# Patient Record
Sex: FEMALE | Race: WHITE | NOT HISPANIC OR LATINO | Employment: FULL TIME | URBAN - METROPOLITAN AREA
[De-identification: names, ages, dates, MRNs, and addresses within clinical notes are randomized per-mention and may not be internally consistent; named-entity substitution may affect disease eponyms.]

---

## 2018-09-10 ENCOUNTER — HOSPITAL ENCOUNTER (EMERGENCY)
Facility: HOSPITAL | Age: 43
Discharge: HOME | End: 2018-09-10
Attending: EMERGENCY MEDICINE
Payer: COMMERCIAL

## 2018-09-10 ENCOUNTER — APPOINTMENT (EMERGENCY)
Dept: CT IMAGING | Facility: HOSPITAL | Age: 43
End: 2018-09-10
Payer: COMMERCIAL

## 2018-09-10 VITALS
WEIGHT: 273.15 LBS | BODY MASS INDEX: 46.63 KG/M2 | HEIGHT: 64 IN | HEART RATE: 70 BPM | TEMPERATURE: 97.9 F | SYSTOLIC BLOOD PRESSURE: 126 MMHG | DIASTOLIC BLOOD PRESSURE: 58 MMHG | RESPIRATION RATE: 20 BRPM | OXYGEN SATURATION: 96 %

## 2018-09-10 DIAGNOSIS — N20.1 URETERAL CALCULUS: Primary | ICD-10-CM

## 2018-09-10 LAB
BACTERIA UR QL AUTO: ABNORMAL /HPF
BILIRUB UR QL STRIP: NEGATIVE
CLARITY UR: ABNORMAL
COLOR UR: YELLOW
GLUCOSE UR STRIP-MCNC: NEGATIVE MG/DL
HGB UR QL STRIP.AUTO: ABNORMAL
KETONES UR STRIP-MCNC: NEGATIVE MG/DL
LEUKOCYTE ESTERASE UR QL STRIP: NEGATIVE
NITRITE UR QL STRIP: NEGATIVE
NON-SQ EPI CELLS URNS QL MICRO: ABNORMAL /HPF
PH UR STRIP.AUTO: 7 [PH] (ref 4.5–8)
PROT UR STRIP-MCNC: NEGATIVE MG/DL
RBC #/AREA URNS AUTO: ABNORMAL /HPF
SP GR UR STRIP.AUTO: 1.01 (ref 1–1.03)
UROBILINOGEN UR QL STRIP.AUTO: 0.2 E.U./DL
WBC #/AREA URNS AUTO: ABNORMAL /HPF

## 2018-09-10 PROCEDURE — 81001 URINALYSIS AUTO W/SCOPE: CPT | Performed by: EMERGENCY MEDICINE

## 2018-09-10 PROCEDURE — 99284 EMERGENCY DEPT VISIT MOD MDM: CPT

## 2018-09-10 PROCEDURE — 74176 CT ABD & PELVIS W/O CONTRAST: CPT

## 2018-09-10 RX ORDER — CIPROFLOXACIN 500 MG/1
500 TABLET, FILM COATED ORAL 2 TIMES DAILY
Qty: 14 TABLET | Refills: 0 | Status: SHIPPED | OUTPATIENT
Start: 2018-09-10 | End: 2018-09-17

## 2018-09-10 RX ORDER — PANTOPRAZOLE SODIUM 40 MG/1
40 TABLET, DELAYED RELEASE ORAL DAILY
COMMUNITY
Start: 2018-03-19 | End: 2019-04-24 | Stop reason: SDUPTHER

## 2018-09-10 RX ORDER — ALBUTEROL SULFATE 90 UG/1
2 AEROSOL, METERED RESPIRATORY (INHALATION) EVERY 6 HOURS PRN
COMMUNITY
Start: 2015-12-27

## 2018-09-10 RX ORDER — PHENAZOPYRIDINE HYDROCHLORIDE 200 MG/1
200 TABLET, FILM COATED ORAL 3 TIMES DAILY
Qty: 6 TABLET | Refills: 0 | Status: SHIPPED | OUTPATIENT
Start: 2018-09-10 | End: 2020-10-22

## 2018-09-10 RX ORDER — CETIRIZINE HYDROCHLORIDE 10 MG/1
10 TABLET ORAL DAILY
COMMUNITY

## 2018-09-10 RX ORDER — TORSEMIDE 10 MG/1
20 TABLET ORAL DAILY
COMMUNITY
Start: 2018-05-16 | End: 2021-09-07

## 2018-09-10 RX ORDER — MONTELUKAST SODIUM 10 MG/1
10 TABLET ORAL DAILY
COMMUNITY
Start: 2018-01-31 | End: 2021-09-07

## 2018-09-10 RX ORDER — PHENAZOPYRIDINE HYDROCHLORIDE 100 MG/1
200 TABLET, FILM COATED ORAL ONCE
Status: COMPLETED | OUTPATIENT
Start: 2018-09-10 | End: 2018-09-10

## 2018-09-10 RX ORDER — PRAMIPEXOLE DIHYDROCHLORIDE 0.25 MG/1
0.25 TABLET ORAL
COMMUNITY
Start: 2018-04-16

## 2018-09-10 RX ADMIN — PHENAZOPYRIDINE HYDROCHLORIDE 200 MG: 100 TABLET ORAL at 12:42

## 2018-09-10 NOTE — ED PROVIDER NOTES
History  Chief Complaint   Patient presents with    Back Pain     Left lower  I think I may have a UTI or a kidney infection"     42-year-old female patient presents to the emergency department for evaluation of left flank pain  The patient states taking some sort of a home test for urinary tract infection the came up positive I am not sure what this test is nor what it is testing for  Currently the patient has left flank tenderness, states no nausea, no vomiting, fevers, no chills  Patient states history urinary tract infections remotely, states that this is not like previous ones, states no history of renal calculi  Differential diagnosis for the patient includes but is not limited to renal calculus, ureteral calculus, urinary tract infection  History provided by:  Patient   used: No    Back Pain   Location:  Lumbar spine  Quality:  Aching  Radiates to:  Does not radiate  Pain severity:  Mild  Onset quality:  Gradual  Timing:  Constant  Progression:  Worsening  Chronicity:  New  Relieved by:  Nothing  Worsened by:  Nothing  Ineffective treatments:  None tried  Associated symptoms: no bladder incontinence, no dysuria, no leg pain, no numbness and no tingling        Prior to Admission Medications   Prescriptions Last Dose Informant Patient Reported? Taking?    albuterol (PROVENTIL HFA,VENTOLIN HFA) 90 mcg/act inhaler   Yes Yes   Sig: Inhale 2 puffs every 6 (six) hours as needed   cetirizine (ZyrTEC) 10 mg tablet   Yes No   Sig: Take 10 mg by mouth daily   montelukast (SINGULAIR) 10 mg tablet   Yes Yes   Sig: Take 10 mg by mouth daily   pantoprazole (PROTONIX) 40 mg tablet   Yes Yes   Sig: Take 40 mg by mouth daily   pramipexole (MIRAPEX) 0 25 mg tablet   Yes Yes   Sig: Take 0 25 tablets by mouth daily at bedtime   tiotropium-olodaterol (STIOLTO RESPIMAT) 2 5-2 5 MCG/ACT inhaler   Yes Yes   Sig: Inhale 2 puffs daily at bedtime   torsemide (DEMADEX) 10 mg tablet   Yes Yes   Sig: Take 20 mg by mouth daily      Facility-Administered Medications: None       Past Medical History:   Diagnosis Date    Asthma     COPD [facility]chronic obstructive pulmonary disease) ([facility] [address] )     Obesity     Sleep apnea        Past Surgical History:   Procedure Laterality Date     SECTION  2013    GASTRIC RESTRICTION SURGERY  2018       History reviewed  No pertinent family history  I have reviewed and agree with the history as documented  Social History   Substance Use Topics    Smoking status: Former Smoker    Smokeless tobacco: Never Used      Comment: 3 years ago 2015    Alcohol use No        Review of Systems   Genitourinary: Negative for bladder incontinence and dysuria  Musculoskeletal: Positive for back pain  Neurological: Negative for tingling and numbness  All other systems reviewed and are negative  Physical Exam  Physical Exam   Constitutional: She is oriented to person, place, and time  She appears well-developed and well-nourished  HENT:   Head: Normocephalic and atraumatic  Right Ear: External ear normal    Left Ear: External ear normal    Eyes: Conjunctivae and EOM are normal    Neck: No JVD present  No tracheal deviation present  No thyromegaly present  Cardiovascular: Normal rate  Pulmonary/Chest: Effort normal and breath sounds normal  No stridor  Abdominal: Soft  She exhibits no distension and no mass  There is no tenderness  There is no guarding  No hernia  Musculoskeletal: Normal range of motion  She exhibits no edema, tenderness or deformity  Lymphadenopathy:     She has no cervical adenopathy  Neurological: She is alert and oriented to person, place, and time  Skin: Skin is warm  No rash noted  No erythema  No pallor  Psychiatric: She has a normal mood and affect  Her behavior is normal    Nursing note and vitals reviewed        Vital Signs  ED Triage Vitals [09/10/18 1035]   Temperature Pulse Respirations Blood Pressure SpO2   97 9 °F (36 6 °C) 70 20 126/58 96 %      Temp Source Heart Rate Source Patient Position - Orthostatic VS BP Location FiO2 (%)   Oral Monitor -- Right arm --      Pain Score       6           Vitals:    09/10/18 1035   BP: 126/58   Pulse: 70       Visual Acuity      ED Medications  Medications   phenazopyridine (PYRIDIUM) tablet 200 mg (200 mg Oral Given 9/10/18 1242)       Diagnostic Studies  Results Reviewed     Procedure Component Value Units Date/Time    Urine Microscopic [[id number]]  (Abnormal) Collected:  09/10/18 1104    Lab Status:  Final result Specimen:  Urine from Urine, Clean Catch Updated:  09/10/18 1124     RBC, UA 20-30 (A) /hpf      WBC, UA 0-1 (A) /hpf      Epithelial Cells Moderate (A) /hpf      Bacteria, UA Occasional /hpf     UA w Reflex to Microscopic w Reflex to Culture [[id number]]  (Abnormal) Collected:  09/10/18 1104    Lab Status:  Final result Specimen:  Urine from Urine, Clean Catch Updated:  09/10/18 1112     Color, UA Yellow     Clarity, UA Slightly Cloudy     Specific [place], UA 1 010     pH, UA 7 0     Leukocytes, UA Negative     Nitrite, UA Negative     Protein, UA Negative mg/dl      Glucose, UA Negative mg/dl      Ketones, UA Negative mg/dl      Urobilinogen, UA 0 2 E U /dl      Bilirubin, UA Negative     Blood, UA Large (A)                 CT renal stone study abdomen pelvis wo contrast   Final Result by Charity Zacarias DO (09/10 1211)      Questionable 1 mm nonobstructing left lower pole calculus  No ureteral calculi or hydronephrosis identified  Shoddy mesenteric and retroperitoneal lymph nodes, not enlarged by CT criteria  Consider follow-up CT study in 3-6 months to ensure stability  Minimal descending and sigmoid colon diverticulosis without evidence of diverticulitis        Limited study without oral or IV contrast       Workstation performed: [id number]                    Procedures  Procedures       Phone Contacts  ED Phone Contact    ED Course                               MDM  Number of Diagnoses or Management Options  Ureteral calculus: new and requires workup     Amount and/or Complexity of Data Reviewed  Clinical lab tests: ordered and reviewed  Tests in the radiology section of CPT®: reviewed and ordered  Decide to obtain previous medical records or to obtain history from someone other than the patient: yes  Review and summarize past medical records: yes    Patient Progress  Patient progress: stable    CritCare Time    Disposition  Final diagnoses:   Ureteral calculus     Time reflects when diagnosis was documented in both MDM as applicable and the Disposition within this note     Time User Action Codes Description Comment    9/10/2018 12:33 PM Theodoro Foil Add [N20 1] Ureteral calculus       ED Disposition     ED Disposition Condition Comment    Discharge  Puneet Milton discharge to home/self care      Condition at discharge: Good        Follow-up Information     Follow up With Specialties Details Why Contact Info    Jeanne Schafer PA-C Physician Assistant   [address] [place] [address] Rd  [phone]            Discharge Medication List as of 9/10/2018 12:35 PM      START taking these medications    Details   ciprofloxacin (CIPRO) 500 mg tablet Take 1 tablet (500 mg total) by mouth 2 (two) times a day for 7 days, Starting Mon 9/10/2018, Until Mon 9/17/2018, Print      phenazopyridine (PYRIDIUM) 200 mg tablet Take 1 tablet (200 mg total) by mouth 3 (three) times a day, Starting Mon 9/10/2018, Print         CONTINUE these medications which have NOT CHANGED    Details   albuterol (PROVENTIL HFA,VENTOLIN HFA) 90 mcg/act inhaler Inhale 2 puffs every 6 (six) hours as needed, Starting Sun 12/27/2015, Historical Med      montelukast (SINGULAIR) 10 mg tablet Take 10 mg by mouth daily, Starting Wed 1/31/2018, Until Thu 1/31/2019, Historical Med      pantoprazole (PROTONIX) 40 mg tablet Take 40 mg by mouth daily, Starting Mon 3/19/2018, Historical Med      pramipexole (MIRAPEX) 0 25 mg tablet Take 0 25 tablets by mouth daily at bedtime, Starting Mon 4/16/2018, Historical Med      tiotropium-olodaterol (STIOLTO RESPIMAT) 2 5-2 5 MCG/ACT inhaler Inhale 2 puffs daily at bedtime, Starting Tue 3/27/2018, Historical Med      torsemide (DEMADEX) 10 mg tablet Take 20 mg by mouth daily, Starting Wed 5/16/2018, Until Thu 5/16/2019, Historical Med      cetirizine (ZyrTEC) 10 mg tablet Take 10 mg by mouth daily, Historical Med           No discharge procedures on file      ED Provider  Electronically Signed by           Deidra Dang,   09/10/18 1563

## 2018-09-10 NOTE — ED NOTES
Pt verbalized understanding of discharge instructions and medications    Pt also verbalized importance of taking all of antibiotic as prescribed     Belem Gonzalez RN  09/10/18 6609

## 2018-09-10 NOTE — DISCHARGE INSTRUCTIONS
Ureteral Stones   WHAT YOU NEED TO KNOW:   What is a ureteral stone? A ureteral stone is a stone that forms in the kidney and moves down the ureter and gets stuck there  The ureter is the tube that takes urine from the kidney to the bladder  Stones can form in the urinary system when your urine has high levels of minerals and salts  Urinary stones can be made of uric acid, calcium, phosphate, or oxalate crystals  What increases my risk for urinary stones? · You do not drink enough liquids (especially water) each day  · You follow a certain type of diet  For example, people who eat a diet high in meat or salt may be at higher risk for urinary stones  People who eat foods high in oxalate may also be at higher risk  Foods that are high in oxalate include nuts, chocolate, coffee, and green leafy vegetables  · You take certain medicines such as diuretics or calcium or vitamin C supplements  · You have a family member who has had urinary stones  · You have conditions such as obesity, a kidney or bowel disorder, or gout  What are the signs and symptoms of ureteral stones? · Severe pain on your lower abdomen or groin    · Nausea and vomiting    · Urge to urinate often, burning feeling when you urinate, or pink or red urine  How are ureteral stones diagnosed? · Urine tests  may show if you have blood in your urine  They may also show high amounts of the substances that form stones, such as uric acid  · Blood tests  show how well your kidneys are working  They may also be used to check the levels of calcium or uric acid in your blood  · An x-ray or CT scan  of your kidneys, ureters, and bladder may be done  You may be given a dye before the pictures are taken to help healthcare providers see the pictures better  You may need to have more than one x-ray  Tell the healthcare provider if you have ever had an allergic reaction to contrast dye  How are ureteral stones treated?    · NSAIDs , such as ibuprofen, help decrease swelling, pain, and fever  This medicine is available with or without a doctor's order  NSAIDs can cause stomach bleeding or kidney problems in certain people  If you take blood thinner medicine, always ask your healthcare provider if NSAIDs are safe for you  Always read the medicine label and follow directions  · Prescription pain medicine  may help decrease pain or help your ureteral stone pass  Do not wait until the pain is severe before you take pain medicine  · Nausea medicine  may help calm your stomach and prevent vomiting  · A procedure or surgery  to remove the ureteral stone may be needed if it does not pass on its own  How can I care for myself at home? · Drink plenty of liquids  Your healthcare provider may tell you to drink at least 8 to 12 (eight-ounce) cups of liquids each day  This helps flush out the stones when you urinate  Water is the best liquid to drink  · Strain your urine every time you go to the bathroom  Urinate through a strainer or a piece of thin cloth to catch the stone  Take the stone to your healthcare provider so it can be sent to the lab for tests  This will help your healthcare providers plan the best treatment for you  · Ask your healthcare provider about any nutrition changes you need to make  You may need to limit certain foods such as foods high in sodium (salt), certain protein foods, or foods high in oxalate  When should I seek immediate care? · You have severe pain that does not improve, even after you take medicine  · You have vomiting that is not relieved by medicine  When should I contact my healthcare provider? · You develop a fever  · You have any questions or concerns about your condition or care  CARE AGREEMENT:   You have the right to help plan your care  Learn about your health condition and how it may be treated   Discuss treatment options with your caregivers to decide what care you want to receive  You always have the right to refuse treatment  The above information is an  only  It is not intended as medical advice for individual conditions or treatments  Talk to your doctor, nurse or pharmacist before following any medical regimen to see if it is safe and effective for you  © 2017 [address] [employer] Information is for End User's use only and may not be sold, redistributed or otherwise used for commercial purposes  All illustrations and images included in CareNotes® are the copyrighted property of A D A M , Inc  or [facility]

## 2019-04-24 DIAGNOSIS — K21.9 GASTROESOPHAGEAL REFLUX DISEASE WITHOUT ESOPHAGITIS: Primary | ICD-10-CM

## 2019-04-24 RX ORDER — PANTOPRAZOLE SODIUM 40 MG/1
40 TABLET, DELAYED RELEASE ORAL DAILY
Qty: 90 TABLET | Refills: 3 | Status: SHIPPED | OUTPATIENT
Start: 2019-04-24 | End: 2020-04-20 | Stop reason: SDUPTHER

## 2020-02-26 RX ORDER — FAMOTIDINE 40 MG/1
40 TABLET, FILM COATED ORAL 2 TIMES DAILY PRN
COMMUNITY
Start: 2020-02-16 | End: 2021-09-07

## 2020-02-26 RX ORDER — FLUTICASONE PROPIONATE 50 MCG
SPRAY, SUSPENSION (ML) NASAL
COMMUNITY
Start: 2018-01-05 | End: 2020-10-22

## 2020-03-02 ENCOUNTER — OFFICE VISIT (OUTPATIENT)
Dept: GASTROENTEROLOGY | Facility: CLINIC | Age: 45
End: 2020-03-02
Payer: COMMERCIAL

## 2020-03-02 VITALS
WEIGHT: 203 LBS | DIASTOLIC BLOOD PRESSURE: 60 MMHG | BODY MASS INDEX: 34.66 KG/M2 | HEIGHT: 64 IN | HEART RATE: 80 BPM | SYSTOLIC BLOOD PRESSURE: 112 MMHG

## 2020-03-02 DIAGNOSIS — K21.9 GASTROESOPHAGEAL REFLUX DISEASE WITHOUT ESOPHAGITIS: Primary | ICD-10-CM

## 2020-03-02 PROCEDURE — 99214 OFFICE O/P EST MOD 30 MIN: CPT | Performed by: INTERNAL MEDICINE

## 2020-03-02 NOTE — PROGRESS NOTES
[facility] - Outpatient Follow-up Note  Antonia Fletcher 40 y o  female MRN: [id number]  Encounter: [id number]          ASSESSMENT AND PLAN:      1  Gastroesophageal reflux disease without esophagitis    2  Abnormal CT scan demonstrating thickening of the distal esophagus    3  Cholelithiasis, asymptomatic    Complete the famotidine bid and then stop, 4-6 more pills remaining    Continue the pantoprazole 40 mg daily    No indication for EGD    Would not recommend Lap Emy      ______________________________________________________________________    SUBJECTIVE:  This 26-year-old female comes to the office today for routine follow-up  She was seen recently in mid February for the sudden onset of abdominal pain with vomiting  This occurred after she had gone into [place] and had a meal with calamari  After she got home she experienced the abdominal pain episode with vomiting  She has had no episodes since that time  She denies any ongoing heartburn symptoms  She denies dysphagia  CT scan of the abdomen showed a 6 mm gallstone without gallbladder wall thickening  She status post gastric was performed sleeve which was performed June 28, 2018  I last performed esophagogastroduodenoscopy on her on to January 16th 2018  The examination was normal at that time  She was negative for Helicobacter pylori at that  She states that her surgeon told her that he was concerned about the symptoms and her gallstone and that she might require laparoscopic cholecystectomy  She was referred to the office regarding the findings of the CT scan but she emphasizes that she is not experiencing any heartburn, chest pain, abdominal pain  REVIEW OF SYSTEMS IS OTHERWISE NEGATIVE        Historical Information   Past Medical History:   Diagnosis Date    Asthma     [facility]) [facility] [address]     Obesity     Sleep apnea      Past Surgical History:   Procedure Laterality Date     SECTION  2013    GASTRIC RESTRICTION SURGERY  2018     Social History   Social History     Substance and Sexual Activity   Alcohol Use No     Social History     Substance and Sexual Activity   Drug Use Yes    Frequency: 7 0 times per week    Types: Marijuana     Social History     Tobacco Use   Smoking Status Former Smoker   Smokeless Tobacco Never Used   Tobacco Comment    3 years ago      Family History   Problem Relation Age of Onset    Liver disease Mother     Heart disease Mother     Heart disease Father     Stroke Father     Diabetes Father        Meds/Allergies       Current Outpatient Medications:     albuterol (PROVENTIL HFA,VENTOLIN HFA) 90 mcg/act inhaler    cetirizine (ZyrTEC) 10 mg tablet    famotidine (PEPCID) 40 MG tablet    fluticasone (FLONASE) 50 mcg/act nasal spray    pantoprazole (PROTONIX) 40 mg tablet    phenazopyridine (PYRIDIUM) 200 mg tablet    pramipexole (MIRAPEX) 0 25 mg tablet    tiotropium-olodaterol (STIOLTO RESPIMAT) 2 5-2 5 MCG/ACT inhaler    montelukast (SINGULAIR) 10 mg tablet    torsemide (DEMADEX) 10 mg tablet    Allergies   Allergen Reactions    Sulfa Antibiotics GI Intolerance           Objective     Blood pressure 112/60, pulse 80, height 5' 4" (1 626 m), weight 92 1 kg (203 lb), not currently breastfeeding  Body mass index is 34 84 kg/m²  PHYSICAL EXAM:      General Appearance:   Alert, cooperative, no distress   HEENT:   Normocephalic, atraumatic, anicteric      Neck:  Supple, symmetrical, trachea midline   Lungs:   Clear to auscultation bilaterally; no rales, rhonchi or wheezing; respirations unlabored    Heart[de-identified]   Regular rate and rhythm; no murmur, rub, or gallop     Abdomen:   Soft, non-tender, non-distended; normal bowel sounds; no masses, no organomegaly    Genitalia:   Deferred    Rectal:   Deferred    Extremities:  No cyanosis, clubbing or edema    Pulses:  2+ and symmetric    Skin:  No jaundice, rashes, or lesions  Lymph nodes:  No palpable cervical lymphadenopathy        Lab Results:   No visits with results within 1 Day(s) from this visit  Latest known visit with results is:   Admission on 09/10/2018, Discharged on 09/10/2018   Component Date Value    Color, UA 09/10/2018 Yellow     Clarity, UA 09/10/2018 Slightly Cloudy     Specific Gravity, UA 09/10/2018 1 010     pH, UA 09/10/2018 7 0     Leukocytes, UA 09/10/2018 Negative     Nitrite, UA 09/10/2018 Negative     Protein, UA 09/10/2018 Negative     Glucose, UA 09/10/2018 Negative     Ketones, UA 09/10/2018 Negative     Urobilinogen, UA 09/10/2018 0 2     Bilirubin, UA 09/10/2018 Negative     Blood, UA 09/10/2018 Large*    RBC, UA 09/10/2018 20-30*    WBC, UA 09/10/2018 0-1*    Epithelial Cells 09/10/2018 Moderate*    Bacteria, UA 09/10/2018 Occasional          Radiology Results:      2020 February  [facility]  Result Impression   Impression:     1   Circumferential mid and distal esophageal wall thickening may be due to  reflux esophagitis  Correlation with symptoms and upper endoscopy is recommended  to be sure that this is benign and not neoplastic  2   Status post gastrectomy without evidence of leak  No obstruction  3   6 mm layering gallstone  4   L5 limbus vertebra  CT examination performed with dose lowering protocol in accordance with [employer]  Workstation:[id number]   Result Narrative   History: Abdominal pain and vomiting  History of gastric sleeve  Technique: Using helical technique, axial images were obtained through the   abdomen and pelvis following administration of 100 cc of Omnipaque 350  intravenous contrast and without oral contrast  Coronal and sagittal  reformations were performed  Comparison: None       Findings:     Abdomen:   Lung Bases: Circumferential mid to distal esophageal wall thickening      Liver: Normal     Gallbladder/Bile ducts: 6 mm layering gallstone  No gallbladder wall thickening  or pericholecystic free fluid  No intra or extrahepatic biliary ductal  dilatation  Pancreas: Normal     Spleen: Upper limits of normal and measures 12 7 cm  Adrenal glands: Normal     Kidneys/Ureters: Normal     Bowel: Status post previous sleeve gastrectomy  Small and large bowel loops are  not dilated or thickened  Mesentry/Peritoneum: No ascites, fluid collection or pneumoperitoneum  Retroperitoneum: No hemorrhage or mass  Lymph nodes: Mildly enlarged retroperitoneal lymph nodes are most likely  reactive  For example, 5 mm left retroperitoneal lymph node  Vessels: Normal     Abdominal Wall: Normal     Pelvis:  Uterus and bilateral ovaries are normal  No free pelvic fluid  Urinary Bladder: Normal     Lymph nodes:  Normal      Bones: No destructive osseous lesions are acute fracture  4 mm well-corticated  ossific fragment along the anterior superior aspect of the L5 vertebral body,  likely limbus vertebra  Other Result Information   Interface, Rad Results In - 02/16/2020  2:37 PM EST  History: Abdominal pain and vomiting  History of gastric sleeve  Technique: Using helical technique, axial images were obtained through the   abdomen and pelvis following administration of 100 cc of Omnipaque 350  intravenous contrast and without oral contrast  Coronal and sagittal  reformations were performed  Comparison: None  Findings:     Abdomen:   Lung Bases: Circumferential mid to distal esophageal wall thickening  Liver: Normal     Gallbladder/Bile ducts: 6 mm layering gallstone  No gallbladder wall thickening  or pericholecystic free fluid  No intra or extrahepatic biliary ductal  dilatation  Pancreas: Normal     Spleen: Upper limits of normal and measures 12 7 cm  Adrenal glands: Normal     Kidneys/Ureters: Normal     Bowel: Status post previous sleeve gastrectomy  Small and large bowel loops are  not dilated or thickened  Mesentry/Peritoneum: No ascites, fluid collection or pneumoperitoneum  Retroperitoneum: No hemorrhage or mass  Lymph nodes: Mildly enlarged retroperitoneal lymph nodes are most likely  reactive  For example, 5 mm left retroperitoneal lymph node  Vessels: Normal     Abdominal Wall: Normal     Pelvis:  Uterus and bilateral ovaries are normal  No free pelvic fluid  Urinary Bladder: Normal     Lymph nodes:  Normal      Bones: No destructive osseous lesions are acute fracture  4 mm well-corticated  ossific fragment along the anterior superior aspect of the L5 vertebral body,  likely limbus vertebra  IMPRESSION:  Impression:     1  Circumferential mid and distal esophageal wall thickening may be due to  reflux esophagitis  Correlation with symptoms and upper endoscopy is recommended  to be sure that this is benign and not neoplastic  2   Status post gastrectomy without evidence of leak  No obstruction  3   6 mm layering gallstone  4   L5 limbus vertebra  CT examination performed with dose lowering protocol in accordance with ALARA  Sean for HPI/ROS submitted by the patient on 2/29/2020   Abdominal pain  Chronicity: new  Onset: 1 to 4 weeks ago  Onset quality: sudden  Frequency: rarely  Episode duration: 2 hours  Progression since onset: waxing and waning  Pain location: epigastric region  Pain - numeric: 6/10  Pain quality: aching  Radiates to: epigastric region, periumbilical region  anorexia: Yes  arthralgias: No  belching: Yes  constipation: No  diarrhea: No  dysuria: No  fever: No  flatus: No  frequency: No  headaches: Yes  hematochezia: No  hematuria: No  melena: No  myalgias: Yes  nausea:  No  weight loss: No  vomiting: Yes  Aggravated by: eating  Relieved by: nothing  Diagnostic workup: CT scan, surgery

## 2020-04-20 DIAGNOSIS — K21.9 GASTROESOPHAGEAL REFLUX DISEASE WITHOUT ESOPHAGITIS: ICD-10-CM

## 2020-04-20 RX ORDER — PANTOPRAZOLE SODIUM 40 MG/1
40 TABLET, DELAYED RELEASE ORAL DAILY
Qty: 90 TABLET | Refills: 3 | Status: SHIPPED | OUTPATIENT
Start: 2020-04-20 | End: 2021-04-14

## 2020-09-03 ENCOUNTER — TELEPHONE (OUTPATIENT)
Dept: GASTROENTEROLOGY | Facility: CLINIC | Age: 45
End: 2020-09-03

## 2020-10-01 ENCOUNTER — PREP FOR PROCEDURE (OUTPATIENT)
Dept: GASTROENTEROLOGY | Facility: CLINIC | Age: 45
End: 2020-10-01

## 2020-10-01 ENCOUNTER — OFFICE VISIT (OUTPATIENT)
Dept: GASTROENTEROLOGY | Facility: CLINIC | Age: 45
End: 2020-10-01
Payer: COMMERCIAL

## 2020-10-01 DIAGNOSIS — R13.19 ESOPHAGEAL DYSPHAGIA: ICD-10-CM

## 2020-10-01 DIAGNOSIS — R19.7 DIARRHEA, UNSPECIFIED TYPE: ICD-10-CM

## 2020-10-01 DIAGNOSIS — K21.9 GASTROESOPHAGEAL REFLUX DISEASE WITHOUT ESOPHAGITIS: Primary | ICD-10-CM

## 2020-10-01 PROCEDURE — 99214 OFFICE O/P EST MOD 30 MIN: CPT | Performed by: INTERNAL MEDICINE

## 2020-10-21 ENCOUNTER — ANESTHESIA EVENT (OUTPATIENT)
Dept: GASTROENTEROLOGY | Facility: HOSPITAL | Age: 45
End: 2020-10-21

## 2020-10-22 ENCOUNTER — HOSPITAL ENCOUNTER (OUTPATIENT)
Dept: GASTROENTEROLOGY | Facility: HOSPITAL | Age: 45
Setting detail: OUTPATIENT SURGERY
Discharge: HOME | End: 2020-10-22
Attending: INTERNAL MEDICINE | Admitting: INTERNAL MEDICINE
Payer: COMMERCIAL

## 2020-10-22 ENCOUNTER — ANESTHESIA (OUTPATIENT)
Dept: GASTROENTEROLOGY | Facility: HOSPITAL | Age: 45
End: 2020-10-22

## 2020-10-22 VITALS
HEART RATE: 73 BPM | OXYGEN SATURATION: 100 % | WEIGHT: 233.47 LBS | HEIGHT: 64 IN | SYSTOLIC BLOOD PRESSURE: 102 MMHG | DIASTOLIC BLOOD PRESSURE: 62 MMHG | RESPIRATION RATE: 16 BRPM | BODY MASS INDEX: 39.86 KG/M2 | TEMPERATURE: 97.4 F

## 2020-10-22 VITALS — HEART RATE: 60 BPM

## 2020-10-22 DIAGNOSIS — R19.7 DIARRHEA, UNSPECIFIED TYPE: ICD-10-CM

## 2020-10-22 DIAGNOSIS — K21.9 GASTROESOPHAGEAL REFLUX DISEASE WITHOUT ESOPHAGITIS: ICD-10-CM

## 2020-10-22 LAB
EXT PREGNANCY TEST URINE: NEGATIVE
EXT. CONTROL: NORMAL

## 2020-10-22 PROCEDURE — 88305 TISSUE EXAM BY PATHOLOGIST: CPT | Performed by: PATHOLOGY

## 2020-10-22 PROCEDURE — 43249 ESOPH EGD DILATION <30 MM: CPT | Performed by: INTERNAL MEDICINE

## 2020-10-22 PROCEDURE — 45380 COLONOSCOPY AND BIOPSY: CPT | Performed by: INTERNAL MEDICINE

## 2020-10-22 PROCEDURE — 81025 URINE PREGNANCY TEST: CPT | Performed by: ANESTHESIOLOGY

## 2020-10-22 PROCEDURE — NC001 PR NO CHARGE: Performed by: INTERNAL MEDICINE

## 2020-10-22 RX ORDER — GLYCOPYRROLATE 0.2 MG/ML
INJECTION INTRAMUSCULAR; INTRAVENOUS AS NEEDED
Status: DISCONTINUED | OUTPATIENT
Start: 2020-10-22 | End: 2020-10-22

## 2020-10-22 RX ORDER — KETAMINE HCL IN NACL, ISO-OSM 100MG/10ML
SYRINGE (ML) INJECTION AS NEEDED
Status: DISCONTINUED | OUTPATIENT
Start: 2020-10-22 | End: 2020-10-22

## 2020-10-22 RX ORDER — SODIUM CHLORIDE, SODIUM LACTATE, POTASSIUM CHLORIDE, CALCIUM CHLORIDE 600; 310; 30; 20 MG/100ML; MG/100ML; MG/100ML; MG/100ML
125 INJECTION, SOLUTION INTRAVENOUS CONTINUOUS
Status: DISCONTINUED | OUTPATIENT
Start: 2020-10-22 | End: 2020-10-26 | Stop reason: HOSPADM

## 2020-10-22 RX ORDER — LIDOCAINE HYDROCHLORIDE 20 MG/ML
INJECTION, SOLUTION EPIDURAL; INFILTRATION; INTRACAUDAL; PERINEURAL AS NEEDED
Status: DISCONTINUED | OUTPATIENT
Start: 2020-10-22 | End: 2020-10-22

## 2020-10-22 RX ORDER — PROPOFOL 10 MG/ML
INJECTION, EMULSION INTRAVENOUS AS NEEDED
Status: DISCONTINUED | OUTPATIENT
Start: 2020-10-22 | End: 2020-10-22

## 2020-10-22 RX ADMIN — SODIUM CHLORIDE, SODIUM LACTATE, POTASSIUM CHLORIDE, AND CALCIUM CHLORIDE 125 ML/HR: .6; .31; .03; .02 INJECTION, SOLUTION INTRAVENOUS at 07:23

## 2020-10-22 RX ADMIN — GLYCOPYRROLATE 0.2 MG: 0.2 INJECTION, SOLUTION INTRAMUSCULAR; INTRAVENOUS at 08:03

## 2020-10-22 RX ADMIN — PROPOFOL 20 MG: 10 INJECTION, EMULSION INTRAVENOUS at 08:07

## 2020-10-22 RX ADMIN — PROPOFOL 30 MG: 10 INJECTION, EMULSION INTRAVENOUS at 08:18

## 2020-10-22 RX ADMIN — LIDOCAINE HYDROCHLORIDE 100 MG: 20 INJECTION, SOLUTION EPIDURAL; INFILTRATION; INTRACAUDAL; PERINEURAL at 08:03

## 2020-10-22 RX ADMIN — Medication 30 MG: at 08:04

## 2020-10-22 RX ADMIN — Medication 10 MG: at 08:11

## 2020-10-22 RX ADMIN — PROPOFOL 30 MG: 10 INJECTION, EMULSION INTRAVENOUS at 08:11

## 2020-10-22 RX ADMIN — PROPOFOL 100 MG: 10 INJECTION, EMULSION INTRAVENOUS at 08:04

## 2020-10-22 RX ADMIN — PROPOFOL 20 MG: 10 INJECTION, EMULSION INTRAVENOUS at 08:14

## 2020-10-22 RX ADMIN — Medication 10 MG: at 08:16

## 2020-10-27 ENCOUNTER — TELEPHONE (OUTPATIENT)
Dept: GASTROENTEROLOGY | Facility: CLINIC | Age: 45
End: 2020-10-27

## 2021-04-14 DIAGNOSIS — K21.9 GASTROESOPHAGEAL REFLUX DISEASE WITHOUT ESOPHAGITIS: ICD-10-CM

## 2021-04-14 RX ORDER — PANTOPRAZOLE SODIUM 40 MG/1
TABLET, DELAYED RELEASE ORAL
Qty: 90 TABLET | Refills: 3 | Status: SHIPPED | OUTPATIENT
Start: 2021-04-14

## 2021-08-03 ENCOUNTER — HOSPITAL ENCOUNTER (EMERGENCY)
Facility: HOSPITAL | Age: 46
Discharge: HOME | End: 2021-08-03
Attending: EMERGENCY MEDICINE
Payer: COMMERCIAL

## 2021-08-03 VITALS
HEART RATE: 70 BPM | BODY MASS INDEX: 39.78 KG/M2 | DIASTOLIC BLOOD PRESSURE: 57 MMHG | RESPIRATION RATE: 20 BRPM | TEMPERATURE: 98.1 F | OXYGEN SATURATION: 100 % | HEIGHT: 64 IN | WEIGHT: 233 LBS | SYSTOLIC BLOOD PRESSURE: 99 MMHG

## 2021-08-03 DIAGNOSIS — M54.9 BACK PAIN: Primary | ICD-10-CM

## 2021-08-03 PROCEDURE — 99283 EMERGENCY DEPT VISIT LOW MDM: CPT

## 2021-08-03 PROCEDURE — 99284 EMERGENCY DEPT VISIT MOD MDM: CPT | Performed by: EMERGENCY MEDICINE

## 2021-08-03 RX ORDER — PREDNISONE 20 MG/1
60 TABLET ORAL DAILY
Qty: 12 TABLET | Refills: 0 | Status: SHIPPED | OUTPATIENT
Start: 2021-08-03 | End: 2021-08-07

## 2021-08-03 RX ORDER — ONDANSETRON 4 MG/1
4 TABLET, ORALLY DISINTEGRATING ORAL EVERY 8 HOURS PRN
Qty: 20 TABLET | Refills: 0 | Status: SHIPPED | OUTPATIENT
Start: 2021-08-03

## 2021-08-03 RX ORDER — METHOCARBAMOL 500 MG/1
500 TABLET, FILM COATED ORAL 3 TIMES DAILY
Qty: 21 TABLET | Refills: 0 | Status: SHIPPED | OUTPATIENT
Start: 2021-08-03

## 2021-08-03 RX ORDER — PREDNISONE 20 MG/1
60 TABLET ORAL ONCE
Status: COMPLETED | OUTPATIENT
Start: 2021-08-03 | End: 2021-08-03

## 2021-08-03 RX ADMIN — PREDNISONE 60 MG: 20 TABLET ORAL at 11:14

## 2021-08-03 NOTE — ED PROVIDER NOTES
History  Chief Complaint   Patient presents with    Back Pain     L hip/back pain x 2 months, denies injury, pt has not taken anything for pain     HPI  54 yo F presents with L sided back pain radiating to hip/leg for the past two months, worsening after prolonged sitting  No trauma  Pain is moderate, not relieved with NSAIDs  No fevers, weakness, numbness, bowel/bladder dysfunction, hematuria or flank pain  Prior to Admission Medications   Prescriptions Last Dose Informant Patient Reported? Taking? albuterol (PROVENTIL HFA,VENTOLIN HFA) 90 mcg/act inhaler  Self Yes No   Sig: Inhale 2 puffs every 6 (six) hours as needed   cetirizine (ZyrTEC) 10 mg tablet  Self Yes No   Sig: Take 10 mg by mouth daily   famotidine (PEPCID) 40 MG tablet  Self Yes No   Sig: Take 40 mg by mouth 2 (two) times a day as needed   montelukast (SINGULAIR) 10 mg tablet   Yes No   Sig: Take 10 mg by mouth daily   pantoprazole (PROTONIX) 40 mg tablet   No No   Sig: take 1 tablet by mouth once daily   pramipexole (MIRAPEX) 0 25 mg tablet  Self Yes No   Sig: Take 0 25 tablets by mouth daily at bedtime   torsemide (DEMADEX) 10 mg tablet   Yes No   Sig: Take 20 mg by mouth daily      Facility-Administered Medications: None       Past Medical History:   Diagnosis Date    Asthma     COPD (chronic obstructive pulmonary disease) (HCC)     Obesity     Sleep apnea        Past Surgical History:   Procedure Laterality Date     SECTION      GASTRIC RESTRICTION SURGERY  2018       Family History   Problem Relation Age of Onset    Liver disease Mother     Heart disease Mother     Heart disease Father     Stroke Father     Diabetes Father      I have reviewed and agree with the history as documented      E-Cigarette/Vaping    E-Cigarette Use Never User      E-Cigarette/Vaping Substances    Nicotine No     THC No     CBD No     Flavoring No     Other No     Unknown No      Social History     Tobacco Use    Smoking status: Former Smoker    Smokeless tobacco: Never Used    Tobacco comment: 3 years ago 2015   Vaping Use    Vaping Use: Never used   Substance Use Topics    Alcohol use: No    Drug use: Not Currently     Frequency: 7 0 times per week     Types: Marijuana       Review of Systems   Constitutional: Negative for chills and fever  HENT: Negative for dental problem and ear pain  Eyes: Negative for pain and redness  Respiratory: Negative for cough and shortness of breath  Cardiovascular: Negative for chest pain and palpitations  Gastrointestinal: Negative for abdominal pain and nausea  Endocrine: Negative for polydipsia and polyphagia  Genitourinary: Negative for dysuria and frequency  Musculoskeletal: Positive for back pain  Negative for arthralgias and joint swelling  Skin: Negative for color change and rash  Neurological: Negative for dizziness and headaches  Psychiatric/Behavioral: Negative for behavioral problems and confusion  All other systems reviewed and are negative  Physical Exam  Physical Exam  Vitals and nursing note reviewed  Constitutional:       General: She is not in acute distress  Appearance: She is well-developed  She is not diaphoretic  HENT:      Head: Atraumatic  Right Ear: External ear normal       Left Ear: External ear normal       Nose: Nose normal    Eyes:      Conjunctiva/sclera: Conjunctivae normal       Pupils: Pupils are equal, round, and reactive to light  Neck:      Vascular: No JVD  Cardiovascular:      Rate and Rhythm: Normal rate and regular rhythm  Heart sounds: Normal heart sounds  No murmur heard  Pulmonary:      Effort: Pulmonary effort is normal  No respiratory distress  Breath sounds: Normal breath sounds  No wheezing  Abdominal:      General: Bowel sounds are normal  There is no distension  Palpations: Abdomen is soft  Tenderness: There is no abdominal tenderness     Musculoskeletal:         General: Normal range of motion  Cervical back: Normal range of motion and neck supple  Comments: +L lumbar paraspinal TTP   Skin:     General: Skin is warm and dry  Capillary Refill: Capillary refill takes less than 2 seconds  Neurological:      Mental Status: She is alert and oriented to person, place, and time  Cranial Nerves: No cranial nerve deficit  Sensory: No sensory deficit  Motor: No weakness  Psychiatric:         Behavior: Behavior normal          Vital Signs  ED Triage Vitals [08/03/21 1047]   Temperature Pulse Respirations Blood Pressure SpO2   98 1 °F (36 7 °C) 70 20 99/57 100 %      Temp Source Heart Rate Source Patient Position - Orthostatic VS BP Location FiO2 (%)   Oral Monitor Sitting Left arm --      Pain Score       7           Vitals:    08/03/21 1047   BP: 99/57   Pulse: 70   Patient Position - Orthostatic VS: Sitting         Visual Acuity      ED Medications  Medications   predniSONE tablet 60 mg (60 mg Oral Given 8/3/21 1114)       Diagnostic Studies  Results Reviewed     None                 No orders to display              Procedures  Procedures         ED Course                             SBIRT 20yo+      Most Recent Value   SBIRT (24 yo +)   In order to provide better care to our patients, we are screening all of our patients for alcohol and drug use  Would it be okay to ask you these screening questions? Unable to answer at this time Filed at: 08/03/2021 1117                    MDM  Patient presents with acute on chronic low back pain, no red flags history or exam, doubt cauda equina or epidural abscess  She has a normal neuro exam  Her presentation is not consistent with kidney stone, pain is not in flank, not colicky in nature, no urinary symptoms  She has tried motrin without improvement  Will treat with steroid, muscle relaxant, referral to [facility] for recheck and further management as needed    Disposition  Final diagnoses:   Back pain     Time reflects when diagnosis was documented in both MDM as applicable and the Disposition within this note     Time User Action Codes Description Comment    8/3/2021 11:06 AM Julianne Radha Arvizu [M54 9] Back pain       ED Disposition     ED Disposition Condition Date/Time Comment    Discharge Stable Tue Aug 3, 2021 11:06 AM Rebecca Marcum discharge to home/self care              Follow-up Information     Follow up With Specialties Details Why Contact Info Additional Information    [facility] Physical Therapy Schedule an appointment as soon as possible for a visit   [phone]          Discharge Medication List as of 8/3/2021 11:08 AM      START taking these medications    Details   methocarbamol (ROBAXIN) 500 mg tablet Take 1 tablet (500 mg total) by mouth 3 (three) times a day, Starting Tue 8/3/2021, Normal      predniSONE 20 mg tablet Take 3 tablets (60 mg total) by mouth daily for 4 days, Starting Tue 8/3/2021, Until Sat 8/7/2021, Normal         CONTINUE these medications which have NOT CHANGED    Details   albuterol (PROVENTIL HFA,VENTOLIN HFA) 90 mcg/act inhaler Inhale 2 puffs every 6 (six) hours as needed, Starting Sun 12/27/2015, Historical Med      cetirizine (ZyrTEC) 10 mg tablet Take 10 mg by mouth daily, Historical Med      famotidine (PEPCID) 40 MG tablet Take 40 mg by mouth 2 (two) times a day as needed, Starting Sun 2/16/2020, Until Mon 2/15/2021, Historical Med      montelukast (SINGULAIR) 10 mg tablet Take 10 mg by mouth daily, Starting Wed 1/31/2018, Until Thu 1/31/2019, Historical Med      pantoprazole (PROTONIX) 40 mg tablet take 1 tablet by mouth once daily, Normal      pramipexole (MIRAPEX) 0 25 mg tablet Take 0 25 tablets by mouth daily at bedtime, Starting Mon 4/16/2018, Historical Med      torsemide (DEMADEX) 10 mg tablet Take 20 mg by mouth daily, Starting Wed 5/16/2018, Until Thu 5/16/2019, Historical Med               PDMP Review     None          ED Provider  Electronically Signed by           Sangeetha Cuadra MD  08/03/21 633 [facility] Hanna Shah MD  08/03/21 7833

## 2021-08-03 NOTE — DISCHARGE INSTRUCTIONS
Please take steroid as prescribed, first dose tomorrow, muscle relaxant (this can make you sleepy), you will get a call from [facility] for further treatment

## 2021-08-05 ENCOUNTER — TELEPHONE (OUTPATIENT)
Dept: PHYSICAL THERAPY | Facility: OTHER | Age: 46
End: 2021-08-05

## 2021-08-05 NOTE — TELEPHONE ENCOUNTER
Nurse reached out to discuss recent referral entered for [facility] program and offerings  Patient stated she did not want nurse to go any further with offerings as she   "will not go through [facility] now"  Nurse offered apologies for any experiences she may have had  Patient very nice and stated she did not blame nurse and thanked her for the call  Nurse respected decision and encouraged pt to fill out survey when arrives  Patient agreed and again thanked nurse for call  Patient declined to participate in the program at this time  Nurse confirmed patient has [facility] contact information and encouraged to call program back if needed in the future  Patient agreed and very appreciative of call and discussion  Nurse wished her well and referral closed per protocol

## 2021-08-23 ENCOUNTER — APPOINTMENT (EMERGENCY)
Dept: CT IMAGING | Facility: HOSPITAL | Age: 46
End: 2021-08-23
Payer: COMMERCIAL

## 2021-08-23 ENCOUNTER — ANESTHESIA EVENT (EMERGENCY)
Dept: PERIOP | Facility: HOSPITAL | Age: 46
End: 2021-08-23
Payer: COMMERCIAL

## 2021-08-23 ENCOUNTER — APPOINTMENT (EMERGENCY)
Dept: ULTRASOUND IMAGING | Facility: HOSPITAL | Age: 46
End: 2021-08-23
Payer: COMMERCIAL

## 2021-08-23 ENCOUNTER — HOSPITAL ENCOUNTER (EMERGENCY)
Facility: HOSPITAL | Age: 46
Discharge: HOME | End: 2021-08-23
Attending: EMERGENCY MEDICINE | Admitting: EMERGENCY MEDICINE
Payer: COMMERCIAL

## 2021-08-23 ENCOUNTER — ANESTHESIA (EMERGENCY)
Dept: PERIOP | Facility: HOSPITAL | Age: 46
End: 2021-08-23
Payer: COMMERCIAL

## 2021-08-23 VITALS
HEIGHT: 64 IN | OXYGEN SATURATION: 100 % | RESPIRATION RATE: 15 BRPM | WEIGHT: 233.69 LBS | DIASTOLIC BLOOD PRESSURE: 57 MMHG | BODY MASS INDEX: 39.9 KG/M2 | SYSTOLIC BLOOD PRESSURE: 115 MMHG | HEART RATE: 49 BPM | TEMPERATURE: 97.5 F

## 2021-08-23 DIAGNOSIS — K80.20 CHOLELITHIASIS: ICD-10-CM

## 2021-08-23 DIAGNOSIS — K80.50 BILIARY COLIC: Primary | ICD-10-CM

## 2021-08-23 PROBLEM — G47.30 SLEEP APNEA: Status: ACTIVE | Noted: 2021-08-23

## 2021-08-23 LAB
ALBUMIN SERPL BCP-MCNC: 2.8 G/DL (ref 3.5–5)
ALP SERPL-CCNC: 52 U/L (ref 46–116)
ALT SERPL W P-5'-P-CCNC: 18 U/L (ref 12–78)
ANION GAP SERPL CALCULATED.3IONS-SCNC: 6 MMOL/L (ref 4–13)
AST SERPL W P-5'-P-CCNC: 14 U/L (ref 5–45)
ATRIAL RATE: 65 BPM
BASOPHILS # BLD AUTO: 0.03 THOUSANDS/ΜL (ref 0–0.1)
BASOPHILS NFR BLD AUTO: 1 % (ref 0–1)
BILIRUB SERPL-MCNC: 0.2 MG/DL (ref 0.2–1)
BUN SERPL-MCNC: 9 MG/DL (ref 5–25)
CALCIUM ALBUM COR SERPL-MCNC: 8.5 MG/DL (ref 8.3–10.1)
CALCIUM SERPL-MCNC: 7.5 MG/DL (ref 8.3–10.1)
CHLORIDE SERPL-SCNC: 108 MMOL/L (ref 100–108)
CO2 SERPL-SCNC: 28 MMOL/L (ref 21–32)
CREAT SERPL-MCNC: 0.68 MG/DL (ref 0.6–1.3)
EOSINOPHIL # BLD AUTO: 0.21 THOUSAND/ΜL (ref 0–0.61)
EOSINOPHIL NFR BLD AUTO: 3 % (ref 0–6)
ERYTHROCYTE [DISTWIDTH] IN BLOOD BY AUTOMATED COUNT: 11.9 % (ref 11.6–15.1)
GFR SERPL CREATININE-BSD FRML MDRD: 105 ML/MIN/1.73SQ M
GLUCOSE SERPL-MCNC: 104 MG/DL (ref 65–140)
GLUCOSE SERPL-MCNC: 98 MG/DL (ref 65–140)
HCG SERPL QL: NEGATIVE
HCT VFR BLD AUTO: 36.4 % (ref 34.8–46.1)
HGB BLD-MCNC: 12 G/DL (ref 11.5–15.4)
IMM GRANULOCYTES # BLD AUTO: 0.02 THOUSAND/UL (ref 0–0.2)
IMM GRANULOCYTES NFR BLD AUTO: 0 % (ref 0–2)
LIPASE SERPL-CCNC: 101 U/L (ref 73–393)
LYMPHOCYTES # BLD AUTO: 1.28 THOUSANDS/ΜL (ref 0.6–4.47)
LYMPHOCYTES NFR BLD AUTO: 20 % (ref 14–44)
MAGNESIUM SERPL-MCNC: 1.7 MG/DL (ref 1.6–2.6)
MCH RBC QN AUTO: 31.2 PG (ref 26.8–34.3)
MCHC RBC AUTO-ENTMCNC: 33 G/DL (ref 31.4–37.4)
MCV RBC AUTO: 95 FL (ref 82–98)
MONOCYTES # BLD AUTO: 0.27 THOUSAND/ΜL (ref 0.17–1.22)
MONOCYTES NFR BLD AUTO: 4 % (ref 4–12)
NEUTROPHILS # BLD AUTO: 4.7 THOUSANDS/ΜL (ref 1.85–7.62)
NEUTS SEG NFR BLD AUTO: 72 % (ref 43–75)
NRBC BLD AUTO-RTO: 0 /100 WBCS
P AXIS: 42 DEGREES
PLATELET # BLD AUTO: 164 THOUSANDS/UL (ref 149–390)
PMV BLD AUTO: 10.9 FL (ref 8.9–12.7)
POTASSIUM SERPL-SCNC: 3.8 MMOL/L (ref 3.5–5.3)
PR INTERVAL: 142 MS
PROT SERPL-MCNC: 6 G/DL (ref 6.4–8.2)
QRS AXIS: 42 DEGREES
QRSD INTERVAL: 82 MS
QT INTERVAL: 430 MS
QTC INTERVAL: 447 MS
RBC # BLD AUTO: 3.85 MILLION/UL (ref 3.81–5.12)
SODIUM SERPL-SCNC: 142 MMOL/L (ref 136–145)
T WAVE AXIS: 31 DEGREES
VENTRICULAR RATE: 65 BPM
WBC # BLD AUTO: 6.51 THOUSAND/UL (ref 4.31–10.16)

## 2021-08-23 PROCEDURE — 84703 CHORIONIC GONADOTROPIN ASSAY: CPT | Performed by: EMERGENCY MEDICINE

## 2021-08-23 PROCEDURE — 83735 ASSAY OF MAGNESIUM: CPT | Performed by: EMERGENCY MEDICINE

## 2021-08-23 PROCEDURE — 47562 LAPAROSCOPIC CHOLECYSTECTOMY: CPT | Performed by: STUDENT IN AN ORGANIZED HEALTH CARE EDUCATION/TRAINING PROGRAM

## 2021-08-23 PROCEDURE — 93010 ELECTROCARDIOGRAM REPORT: CPT | Performed by: INTERNAL MEDICINE

## 2021-08-23 PROCEDURE — 85025 COMPLETE CBC W/AUTO DIFF WBC: CPT | Performed by: EMERGENCY MEDICINE

## 2021-08-23 PROCEDURE — 47562 LAPAROSCOPIC CHOLECYSTECTOMY: CPT | Performed by: CLINICAL NURSE SPECIALIST

## 2021-08-23 PROCEDURE — 82948 REAGENT STRIP/BLOOD GLUCOSE: CPT

## 2021-08-23 PROCEDURE — 96374 THER/PROPH/DIAG INJ IV PUSH: CPT

## 2021-08-23 PROCEDURE — 83690 ASSAY OF LIPASE: CPT | Performed by: EMERGENCY MEDICINE

## 2021-08-23 PROCEDURE — 80053 COMPREHEN METABOLIC PANEL: CPT | Performed by: EMERGENCY MEDICINE

## 2021-08-23 PROCEDURE — 76705 ECHO EXAM OF ABDOMEN: CPT

## 2021-08-23 PROCEDURE — 96361 HYDRATE IV INFUSION ADD-ON: CPT

## 2021-08-23 PROCEDURE — 88304 TISSUE EXAM BY PATHOLOGIST: CPT | Performed by: PATHOLOGY

## 2021-08-23 PROCEDURE — 99285 EMERGENCY DEPT VISIT HI MDM: CPT

## 2021-08-23 PROCEDURE — 99244 OFF/OP CNSLTJ NEW/EST MOD 40: CPT | Performed by: STUDENT IN AN ORGANIZED HEALTH CARE EDUCATION/TRAINING PROGRAM

## 2021-08-23 PROCEDURE — 74177 CT ABD & PELVIS W/CONTRAST: CPT

## 2021-08-23 PROCEDURE — 93005 ELECTROCARDIOGRAM TRACING: CPT

## 2021-08-23 PROCEDURE — 99285 EMERGENCY DEPT VISIT HI MDM: CPT | Performed by: EMERGENCY MEDICINE

## 2021-08-23 PROCEDURE — 96375 TX/PRO/DX INJ NEW DRUG ADDON: CPT

## 2021-08-23 PROCEDURE — 96372 THER/PROPH/DIAG INJ SC/IM: CPT

## 2021-08-23 PROCEDURE — 96376 TX/PRO/DX INJ SAME DRUG ADON: CPT

## 2021-08-23 PROCEDURE — 36415 COLL VENOUS BLD VENIPUNCTURE: CPT | Performed by: EMERGENCY MEDICINE

## 2021-08-23 RX ORDER — KETOROLAC TROMETHAMINE 30 MG/ML
INJECTION, SOLUTION INTRAMUSCULAR; INTRAVENOUS AS NEEDED
Status: DISCONTINUED | OUTPATIENT
Start: 2021-08-23 | End: 2021-08-23

## 2021-08-23 RX ORDER — ONDANSETRON 2 MG/ML
4 INJECTION INTRAMUSCULAR; INTRAVENOUS ONCE AS NEEDED
Status: DISCONTINUED | OUTPATIENT
Start: 2021-08-23 | End: 2021-08-23 | Stop reason: HOSPADM

## 2021-08-23 RX ORDER — SODIUM CHLORIDE, SODIUM LACTATE, POTASSIUM CHLORIDE, CALCIUM CHLORIDE 600; 310; 30; 20 MG/100ML; MG/100ML; MG/100ML; MG/100ML
50 INJECTION, SOLUTION INTRAVENOUS CONTINUOUS
Status: DISCONTINUED | OUTPATIENT
Start: 2021-08-23 | End: 2021-08-23 | Stop reason: HOSPADM

## 2021-08-23 RX ORDER — ONDANSETRON 2 MG/ML
4 INJECTION INTRAMUSCULAR; INTRAVENOUS ONCE
Status: COMPLETED | OUTPATIENT
Start: 2021-08-23 | End: 2021-08-23

## 2021-08-23 RX ORDER — ALBUTEROL SULFATE 2.5 MG/3ML
2.5 SOLUTION RESPIRATORY (INHALATION) ONCE AS NEEDED
Status: DISCONTINUED | OUTPATIENT
Start: 2021-08-23 | End: 2021-08-23 | Stop reason: HOSPADM

## 2021-08-23 RX ORDER — PROPOFOL 10 MG/ML
INJECTION, EMULSION INTRAVENOUS CONTINUOUS PRN
Status: DISCONTINUED | OUTPATIENT
Start: 2021-08-23 | End: 2021-08-23

## 2021-08-23 RX ORDER — OXYCODONE HYDROCHLORIDE 5 MG/1
5 TABLET ORAL EVERY 4 HOURS PRN
Qty: 8 TABLET | Refills: 0 | Status: SHIPPED | OUTPATIENT
Start: 2021-08-23 | End: 2021-08-26

## 2021-08-23 RX ORDER — CEFAZOLIN SODIUM 2 G/50ML
2000 SOLUTION INTRAVENOUS ONCE
Status: DISCONTINUED | OUTPATIENT
Start: 2021-08-23 | End: 2021-08-23 | Stop reason: HOSPADM

## 2021-08-23 RX ORDER — MORPHINE SULFATE 4 MG/ML
4 INJECTION, SOLUTION INTRAMUSCULAR; INTRAVENOUS ONCE
Status: COMPLETED | OUTPATIENT
Start: 2021-08-23 | End: 2021-08-23

## 2021-08-23 RX ORDER — MIDAZOLAM HYDROCHLORIDE 2 MG/2ML
INJECTION, SOLUTION INTRAMUSCULAR; INTRAVENOUS AS NEEDED
Status: DISCONTINUED | OUTPATIENT
Start: 2021-08-23 | End: 2021-08-23

## 2021-08-23 RX ORDER — PROPOFOL 10 MG/ML
INJECTION, EMULSION INTRAVENOUS AS NEEDED
Status: DISCONTINUED | OUTPATIENT
Start: 2021-08-23 | End: 2021-08-23

## 2021-08-23 RX ORDER — FENTANYL CITRATE/PF 50 MCG/ML
50 SYRINGE (ML) INJECTION
Status: DISCONTINUED | OUTPATIENT
Start: 2021-08-23 | End: 2021-08-23 | Stop reason: HOSPADM

## 2021-08-23 RX ORDER — ONDANSETRON 2 MG/ML
INJECTION INTRAMUSCULAR; INTRAVENOUS AS NEEDED
Status: DISCONTINUED | OUTPATIENT
Start: 2021-08-23 | End: 2021-08-23

## 2021-08-23 RX ORDER — CEFAZOLIN SODIUM 2 G/50ML
2000 SOLUTION INTRAVENOUS ONCE
Status: COMPLETED | OUTPATIENT
Start: 2021-08-23 | End: 2021-08-23

## 2021-08-23 RX ORDER — SODIUM CHLORIDE, SODIUM LACTATE, POTASSIUM CHLORIDE, CALCIUM CHLORIDE 600; 310; 30; 20 MG/100ML; MG/100ML; MG/100ML; MG/100ML
125 INJECTION, SOLUTION INTRAVENOUS CONTINUOUS
Status: DISCONTINUED | OUTPATIENT
Start: 2021-08-23 | End: 2021-08-23 | Stop reason: HOSPADM

## 2021-08-23 RX ORDER — BUPIVACAINE HYDROCHLORIDE 2.5 MG/ML
INJECTION, SOLUTION EPIDURAL; INFILTRATION; INTRACAUDAL AS NEEDED
Status: DISCONTINUED | OUTPATIENT
Start: 2021-08-23 | End: 2021-08-23 | Stop reason: HOSPADM

## 2021-08-23 RX ORDER — LIDOCAINE HYDROCHLORIDE 10 MG/ML
INJECTION, SOLUTION EPIDURAL; INFILTRATION; INTRACAUDAL; PERINEURAL AS NEEDED
Status: DISCONTINUED | OUTPATIENT
Start: 2021-08-23 | End: 2021-08-23

## 2021-08-23 RX ORDER — CEFAZOLIN SODIUM 2 G/50ML
2000 SOLUTION INTRAVENOUS
Status: COMPLETED | OUTPATIENT
Start: 2021-08-23 | End: 2021-08-23

## 2021-08-23 RX ORDER — OXYCODONE HYDROCHLORIDE 5 MG/1
5 TABLET ORAL EVERY 4 HOURS PRN
Status: DISCONTINUED | OUTPATIENT
Start: 2021-08-23 | End: 2021-08-23 | Stop reason: HOSPADM

## 2021-08-23 RX ORDER — ROCURONIUM BROMIDE 10 MG/ML
INJECTION, SOLUTION INTRAVENOUS AS NEEDED
Status: DISCONTINUED | OUTPATIENT
Start: 2021-08-23 | End: 2021-08-23

## 2021-08-23 RX ORDER — FENTANYL CITRATE 50 UG/ML
INJECTION, SOLUTION INTRAMUSCULAR; INTRAVENOUS AS NEEDED
Status: DISCONTINUED | OUTPATIENT
Start: 2021-08-23 | End: 2021-08-23

## 2021-08-23 RX ORDER — DEXAMETHASONE SODIUM PHOSPHATE 4 MG/ML
INJECTION, SOLUTION INTRA-ARTICULAR; INTRALESIONAL; INTRAMUSCULAR; INTRAVENOUS; SOFT TISSUE AS NEEDED
Status: DISCONTINUED | OUTPATIENT
Start: 2021-08-23 | End: 2021-08-23

## 2021-08-23 RX ORDER — SCOLOPAMINE TRANSDERMAL SYSTEM 1 MG/1
1 PATCH, EXTENDED RELEASE TRANSDERMAL
Status: DISCONTINUED | OUTPATIENT
Start: 2021-08-23 | End: 2021-08-23 | Stop reason: HOSPADM

## 2021-08-23 RX ORDER — ACETAMINOPHEN 325 MG/1
975 TABLET ORAL ONCE
Status: COMPLETED | OUTPATIENT
Start: 2021-08-23 | End: 2021-08-23

## 2021-08-23 RX ORDER — OXYCODONE HYDROCHLORIDE 5 MG/1
10 TABLET ORAL EVERY 4 HOURS PRN
Status: DISCONTINUED | OUTPATIENT
Start: 2021-08-23 | End: 2021-08-23 | Stop reason: HOSPADM

## 2021-08-23 RX ORDER — SUCCINYLCHOLINE/SOD CL,ISO/PF 100 MG/5ML
SYRINGE (ML) INTRAVENOUS AS NEEDED
Status: DISCONTINUED | OUTPATIENT
Start: 2021-08-23 | End: 2021-08-23

## 2021-08-23 RX ORDER — GLYCOPYRROLATE 0.2 MG/ML
INJECTION INTRAMUSCULAR; INTRAVENOUS AS NEEDED
Status: DISCONTINUED | OUTPATIENT
Start: 2021-08-23 | End: 2021-08-23

## 2021-08-23 RX ORDER — MAGNESIUM HYDROXIDE 1200 MG/15ML
LIQUID ORAL AS NEEDED
Status: DISCONTINUED | OUTPATIENT
Start: 2021-08-23 | End: 2021-08-23 | Stop reason: HOSPADM

## 2021-08-23 RX ORDER — METOCLOPRAMIDE HYDROCHLORIDE 5 MG/ML
10 INJECTION INTRAMUSCULAR; INTRAVENOUS ONCE AS NEEDED
Status: DISCONTINUED | OUTPATIENT
Start: 2021-08-23 | End: 2021-08-23 | Stop reason: HOSPADM

## 2021-08-23 RX ORDER — HYDROMORPHONE HCL/PF 1 MG/ML
SYRINGE (ML) INJECTION AS NEEDED
Status: DISCONTINUED | OUTPATIENT
Start: 2021-08-23 | End: 2021-08-23

## 2021-08-23 RX ORDER — DIPHENHYDRAMINE HYDROCHLORIDE 50 MG/ML
INJECTION INTRAMUSCULAR; INTRAVENOUS AS NEEDED
Status: DISCONTINUED | OUTPATIENT
Start: 2021-08-23 | End: 2021-08-23

## 2021-08-23 RX ORDER — ACETAMINOPHEN 325 MG/1
650 TABLET ORAL EVERY 6 HOURS PRN
Status: DISCONTINUED | OUTPATIENT
Start: 2021-08-23 | End: 2021-08-23 | Stop reason: HOSPADM

## 2021-08-23 RX ORDER — HYDROMORPHONE HCL/PF 1 MG/ML
0.5 SYRINGE (ML) INJECTION
Status: DISCONTINUED | OUTPATIENT
Start: 2021-08-23 | End: 2021-08-23 | Stop reason: HOSPADM

## 2021-08-23 RX ADMIN — MIDAZOLAM HYDROCHLORIDE 2 MG: 1 INJECTION, SOLUTION INTRAMUSCULAR; INTRAVENOUS at 10:23

## 2021-08-23 RX ADMIN — HYDROMORPHONE HYDROCHLORIDE 0.5 MG: 1 INJECTION, SOLUTION INTRAMUSCULAR; INTRAVENOUS; SUBCUTANEOUS at 11:07

## 2021-08-23 RX ADMIN — KETOROLAC TROMETHAMINE 15 MG: 30 INJECTION, SOLUTION INTRAMUSCULAR at 11:30

## 2021-08-23 RX ADMIN — PROPOFOL 100 MCG/KG/MIN: 10 INJECTION, EMULSION INTRAVENOUS at 10:28

## 2021-08-23 RX ADMIN — FENTANYL CITRATE 50 MCG: 50 INJECTION, SOLUTION INTRAMUSCULAR; INTRAVENOUS at 10:51

## 2021-08-23 RX ADMIN — DIPHENHYDRAMINE HYDROCHLORIDE 12.5 MG: 50 INJECTION, SOLUTION INTRAMUSCULAR; INTRAVENOUS at 10:28

## 2021-08-23 RX ADMIN — CEFAZOLIN SODIUM 2000 MG: 2 SOLUTION INTRAVENOUS at 09:11

## 2021-08-23 RX ADMIN — LIDOCAINE HYDROCHLORIDE 50 MG: 10 INJECTION, SOLUTION EPIDURAL; INFILTRATION; INTRACAUDAL; PERINEURAL at 10:24

## 2021-08-23 RX ADMIN — ONDANSETRON 4 MG: 2 INJECTION INTRAMUSCULAR; INTRAVENOUS at 09:05

## 2021-08-23 RX ADMIN — IOHEXOL 100 ML: 350 INJECTION, SOLUTION INTRAVENOUS at 03:36

## 2021-08-23 RX ADMIN — FENTANYL CITRATE 50 MCG: 50 INJECTION, SOLUTION INTRAMUSCULAR; INTRAVENOUS at 10:25

## 2021-08-23 RX ADMIN — Medication 100 MG: at 10:26

## 2021-08-23 RX ADMIN — MORPHINE SULFATE 4 MG: 4 INJECTION INTRAVENOUS at 05:04

## 2021-08-23 RX ADMIN — ONDANSETRON 4 MG: 2 INJECTION INTRAMUSCULAR; INTRAVENOUS at 02:40

## 2021-08-23 RX ADMIN — CEFAZOLIN SODIUM 2000 MG: 2 SOLUTION INTRAVENOUS at 10:10

## 2021-08-23 RX ADMIN — SODIUM CHLORIDE, SODIUM LACTATE, POTASSIUM CHLORIDE, AND CALCIUM CHLORIDE: .6; .31; .03; .02 INJECTION, SOLUTION INTRAVENOUS at 11:54

## 2021-08-23 RX ADMIN — ROCURONIUM BROMIDE 5 MG: 10 INJECTION, SOLUTION INTRAVENOUS at 10:25

## 2021-08-23 RX ADMIN — SODIUM CHLORIDE, SODIUM LACTATE, POTASSIUM CHLORIDE, AND CALCIUM CHLORIDE 125 ML/HR: .6; .31; .03; .02 INJECTION, SOLUTION INTRAVENOUS at 09:05

## 2021-08-23 RX ADMIN — GLYCOPYRROLATE 0.2 MG: 0.2 INJECTION, SOLUTION INTRAMUSCULAR; INTRAVENOUS at 10:25

## 2021-08-23 RX ADMIN — SUGAMMADEX 100 MG: 100 INJECTION, SOLUTION INTRAVENOUS at 11:33

## 2021-08-23 RX ADMIN — PROPOFOL 200 MG: 10 INJECTION, EMULSION INTRAVENOUS at 10:26

## 2021-08-23 RX ADMIN — ONDANSETRON 4 MG: 2 INJECTION INTRAMUSCULAR; INTRAVENOUS at 11:30

## 2021-08-23 RX ADMIN — MORPHINE SULFATE 4 MG: 4 INJECTION INTRAVENOUS at 02:40

## 2021-08-23 RX ADMIN — ACETAMINOPHEN 975 MG: 325 TABLET, FILM COATED ORAL at 09:43

## 2021-08-23 RX ADMIN — SCOPALAMINE 1 PATCH: 1 PATCH, EXTENDED RELEASE TRANSDERMAL at 10:23

## 2021-08-23 RX ADMIN — SODIUM CHLORIDE 1000 ML: 0.9 INJECTION, SOLUTION INTRAVENOUS at 06:08

## 2021-08-23 RX ADMIN — ENOXAPARIN SODIUM 40 MG: 40 INJECTION SUBCUTANEOUS at 09:05

## 2021-08-23 RX ADMIN — ROCURONIUM BROMIDE 25 MG: 10 INJECTION, SOLUTION INTRAVENOUS at 10:37

## 2021-08-23 RX ADMIN — SODIUM CHLORIDE 1000 ML: 0.9 INJECTION, SOLUTION INTRAVENOUS at 02:39

## 2021-08-23 RX ADMIN — DEXAMETHASONE SODIUM PHOSPHATE 4 MG: 4 INJECTION, SOLUTION INTRAMUSCULAR; INTRAVENOUS at 10:28

## 2021-08-23 RX ADMIN — SODIUM CHLORIDE 0.2 MCG/KG/HR: 9 INJECTION, SOLUTION INTRAVENOUS at 10:28

## 2021-08-23 NOTE — DISCHARGE INSTRUCTIONS
Follow up: Following discharge from the hospital call the office in 1-2 days to set up a post operative appointment to be seen in 2 weeks  [phone]  Call the office if you have increased pain not relieved with pain medicine  Call the office if you have a fever,redness, the wound opens up, you have pus draining from your incision  Laparoscopic Cholecystectomy    WHAT YOU SHOULD KNOW:    Cholecystitis is inflammation of your gallbladder  Your gallbladder stores bile, which helps break down the fat that you eat  It also helps remove certain chemicals from your body  You may have a sudden, severe attack (acute cholecystitis) or several mild attacks (chronic cholecystitis)  Laparoscopic cholecystectomy is surgery to remove your gallbladder  During this surgery, small incisions are made in your abdomen  A small scope and special tools are inserted through these incisions  Your gallbladder is removed from it normal location and taken out of your abdomen  The incisions are closed with sutures and a small amount of glue is applied over top incisions to help reinforce the incisions to optimize healing          AFTER YOU LEAVE: Following discharge from the hospital, you may have some questions about your procedure, your activities or your general condition  These instructions may answer some of your questions and help you adjust during the first few days following your operation  You can expect to be sore and tender mostly around the incisions  This pain should last approximally 5 days and gradually improve daily  Incisions:  - You may remove the gauze dressing from your incisions 48 hours after surgery  Underneath this dressing is a tape like dressing called Steri Strips  Leave the steri strips in place for 5-7 days  They may fall off on their own  This is OK  - You may apply ice to the incisions to help with pain   Avoid heat as this may make the glue tacky   - It is normal to have some bruising, swelling or mild discoloration around the incision  If increasing redness or pain develops, call our office immediately  Do not apply any creams, lotions, or ointments  Bathing:   - You may shower daily with soap and water the day after the procedure  It is OK to GENTLY wash the incision with soap and water then pat dry  DO NOT SCRUB  Do NOT soak incision in a tub, pool, or hot tub for 2 weeks  Diet:   - Resume your normal diet unless specified otherwise  We recommend you slowly advance your diet  Try to start with softer bland foods and gradually advance as tolerated  Be sure to consume plenty of water  Avoid alcohol  Activity/Restrictions:   - The evening following the procedure you should rest as much as possible, sitting, lying or reclining  you should be sure someone remains with you until the next morning  Gradually increase your activity daily  Walking 3-4 times daily is good and stairs are ok  Listen to your body  If you start to get tired or sore then rest    - No strenuous activity or exercise for 3-4 weeks  - No heavy lifting, pushing or pulling    - No driving for 5 days or while taking narcotics for pain  Return or work: You may return to work or other activities as soon as your pain is controlled and you feel comfortable  For many people, this is 5 to 7 days after surgery  If your job requires heavy lifting you will need to be on light duty for 2-3 weeks  Medication:   - If you were given a prescription for Percocet, Norco, or Vicodin for pain be sure to eat prior to taking as these medications as they may cause nausea and vomiting on an empty stomach     - If you do not want to take stronger medications for pain, you may take Tylenol, Aleve OR Ibuprofen  - DO NOT take Tylenol  (acetaminophen) with these medication for a fever or for further pain control as these medications already contain Tylenol in them  Take one or the other    Do not exceed more than 4000 mg of acetaminophen in 24 hours or 3000 mg if you have liver disease  - If you were given an antibiotic take it until it is finished  Diet: Eat low-fat foods for 4 to 6 weeks while your body learns to digest fat without a gallbladder  Slowly increase the amount of fat that you eat  We recommend you slowly advance your diet  Try to start with softer bland foods and gradually advance as tolerated  Be sure to consume plenty of water  Avoid alcohol  Contact your healthcare provider if:   · You have a fever over 101°F (38°C) or chills  · You have pain or nausea that is not relieved by medicine  · You have redness and swelling around your incisions, or blood or pus is leaking             from your incisions  · You are constipated or have diarrhea  · Your skin or eyes are yellow, or your bowel movements are pale  · You have questions or concerns about your surgery, condition, or care  Seek care immediately or call 911 if:   · You cannot stop vomiting  · Your bowel movements are black or bloody  · You have pain in your abdomen and it is swollen or hard  · Your arm or leg feels warm, tender, and painful  It may look swollen and red  · You feel lightheaded, short of breath, and have chest pain  · You cough up blood

## 2021-08-23 NOTE — ED PROVIDER NOTES
History  Chief Complaint   Patient presents with    Abdominal Pain     RUQ pain with belching hx of gastric sleeve      Patient is a 27-year-old female past medical history of asthma, COPD, gastric sleeve in 2018 presenting for abdominal back pain  Patient states that 7:00 p m  Shortly after eating popcorn she began experiencing diffuse upper quadrant abdominal pain which radiates back and forth between her abdomen and back, is constant, described as a pressure, states nothing makes it better worse including Protonix which she took roughly 2 hours ago  She states that she has had similar pain in the past but has never lasted this long  Notes excessive belching but denies any nausea/vomiting/diarrhea/constipation, chest pain, shortness breath, dizziness, fevers, cough, respiratory symptoms, rashes, vision changes, urinary symptoms  Denies any leg pain or swelling, recent cancer diagnosis, recent surgeries, recent traumatic injuries, prior blood clots or coagulopathies, hemoptysis, use of estrogen products  Prior to Admission Medications   Prescriptions Last Dose Informant Patient Reported? Taking?    albuterol (PROVENTIL HFA,VENTOLIN HFA) 90 mcg/act inhaler  Self Yes No   Sig: Inhale 2 puffs every 6 (six) hours as needed   cetirizine (ZyrTEC) 10 mg tablet  Self Yes No   Sig: Take 10 mg by mouth daily   famotidine (PEPCID) 40 MG tablet  Self Yes No   Sig: Take 40 mg by mouth 2 (two) times a day as needed   methocarbamol (ROBAXIN) 500 mg tablet   No No   Sig: Take 1 tablet (500 mg total) by mouth 3 (three) times a day   montelukast (SINGULAIR) 10 mg tablet   Yes No   Sig: Take 10 mg by mouth daily   ondansetron (ZOFRAN-ODT) 4 mg disintegrating tablet   No No   Sig: Take 1 tablet (4 mg total) by mouth every 8 (eight) hours as needed for nausea   pantoprazole (PROTONIX) 40 mg tablet   No No   Sig: take 1 tablet by mouth once daily   pramipexole (MIRAPEX) 0 25 mg tablet  Self Yes No   Sig: Take 0 25 tablets by mouth daily at bedtime   torsemide (DEMADEX) 10 mg tablet   Yes No   Sig: Take 20 mg by mouth daily      Facility-Administered Medications: None       Past Medical History:   Diagnosis Date    Asthma     COPD (chronic obstructive pulmonary disease) (HCC)     Obesity     Sleep apnea        Past Surgical History:   Procedure Laterality Date     SECTION  2013    GASTRIC RESTRICTION SURGERY  2018       Family History   Problem Relation Age of Onset    Liver disease Mother     Heart disease Mother     Heart disease Father     Stroke Father     Diabetes Father      I have reviewed and agree with the history as documented  E-Cigarette/Vaping    E-Cigarette Use Never User      E-Cigarette/Vaping Substances    Nicotine No     THC No     CBD No     Flavoring No     Other No     Unknown No      Social History     Tobacco Use    Smoking status: Former Smoker    Smokeless tobacco: Never Used    Tobacco comment: 3 years ago    Vaping Use    Vaping Use: Never used   Substance Use Topics    Alcohol use: No    Drug use: Not Currently     Frequency: 7 0 times per week     Types: Marijuana       Review of Systems   All other systems reviewed and are negative  Physical Exam  Physical Exam  Vitals reviewed  Constitutional:       General: She is not in acute distress  Appearance: Normal appearance  She is not ill-appearing  HENT:      Mouth/Throat:      Mouth: Mucous membranes are moist    Eyes:      Conjunctiva/sclera: Conjunctivae normal       Comments: Normal conjunctiva   Cardiovascular:      Rate and Rhythm: Normal rate and regular rhythm  Heart sounds: Normal heart sounds  Pulmonary:      Effort: Pulmonary effort is normal       Breath sounds: Normal breath sounds  Abdominal:      General: Abdomen is flat  Palpations: Abdomen is soft  Tenderness: There is abdominal tenderness in the right upper quadrant   There is no right CVA tenderness, left CVA tenderness or guarding  Negative signs include Healy's sign  Musculoskeletal:         General: No swelling  Normal range of motion  Cervical back: Neck supple  Skin:     General: Skin is warm and dry  Neurological:      General: No focal deficit present  Mental Status: She is alert     Psychiatric:         Mood and Affect: Mood normal          Vital Signs  ED Triage Vitals [08/23/21 0220]   Temperature Pulse Respirations Blood Pressure SpO2   97 8 °F (36 6 °C) 63 18 130/63 99 %      Temp Source Heart Rate Source Patient Position - Orthostatic VS BP Location FiO2 (%)   Oral Monitor Lying Right arm --      Pain Score       7           Vitals:    08/23/21 0506 08/23/21 0530 08/23/21 0600 08/23/21 0700   BP: 99/58 96/51 (!) 88/52 91/57   Pulse: 71 56 69 62   Patient Position - Orthostatic VS: Lying            Visual Acuity      ED Medications  Medications   sodium chloride 0 9 % bolus 1,000 mL (1,000 mL Intravenous New Bag 8/23/21 0608)   morphine (PF) 4 mg/mL injection 4 mg (4 mg Intravenous Given 8/23/21 0240)   ondansetron (ZOFRAN) injection 4 mg (4 mg Intravenous Given 8/23/21 0240)   sodium chloride 0 9 % bolus 1,000 mL (0 mL Intravenous Stopped 8/23/21 0339)   iohexol (OMNIPAQUE) 350 MG/ML injection (MULTI-DOSE) 100 mL (100 mL Intravenous Given 8/23/21 0336)   morphine (PF) 4 mg/mL injection 4 mg (4 mg Intravenous Given 8/23/21 0504)       Diagnostic Studies  Results Reviewed     Procedure Component Value Units Date/Time    Lipase [[id number]]  (Normal) Collected: 08/23/21 0237    Lab Status: Final result Specimen: Blood from Arm, Left Updated: 08/23/21 0315     Lipase 101 u/L     Magnesium [[id number]]  (Normal) Collected: 08/23/21 0237    Lab Status: Final result Specimen: Blood from Arm, Left Updated: 08/23/21 0315     Magnesium 1 7 mg/dL     hCG, qualitative pregnancy [[id number]]  (Normal) Collected: 08/23/21 0237    Lab Status: Final result Specimen: Blood from Arm, Left Updated: 08/23/21 1223 Preg, Serum Negative    Comprehensive metabolic panel [[id number]]  (Abnormal) Collected: 08/23/21 0237    Lab Status: Final result Specimen: Blood from Arm, Left Updated: 08/23/21 0310     Sodium 142 mmol/L      Potassium 3 8 mmol/L      Chloride 108 mmol/L      CO2 28 mmol/L      ANION GAP 6 mmol/L      BUN 9 mg/dL      Creatinine 0 68 mg/dL      Glucose 98 mg/dL      Calcium 7 5 mg/dL      Corrected Calcium 8 5 mg/dL      AST 14 U/L      ALT 18 U/L      Alkaline Phosphatase 52 U/L      Total Protein 6 0 g/dL      Albumin 2 8 g/dL      Total Bilirubin 0 20 mg/dL      eGFR 105 ml/min/1 73sq m     Narrative:      National Kid[employer] guidelines for Chronic Kidney Disease (CKD):     Stage 1 with normal or high GFR (GFR > 90 mL/min/1 73 square meters)    Stage 2 Mild CKD (GFR = 60-89 mL/min/1 73 square meters)    Stage 3A Moderate CKD (GFR = 45-59 mL/min/1 73 square meters)    Stage 3B Moderate CKD (GFR = 30-44 mL/min/1 73 square meters)    Stage 4 Severe CKD (GFR = 15-29 mL/min/1 73 square meters)    Stage 5 End Stage CKD (GFR <15 mL/min/1 73 square meters)  Note: GFR calculation is accurate only with a steady state creatinine    CBC and differential [[id number]] Collected: 08/23/21 0237    Lab Status: Final result Specimen: Blood from Arm, Left Updated: 08/23/21 0250     WBC 6 51 Thousand/uL      RBC 3 85 Million/uL      Hemoglobin 12 0 g/dL      Hematocrit 36 4 %      MCV 95 fL      MCH 31 2 pg      MCHC 33 0 g/dL      RDW 11 9 %      MPV 10 9 fL      Platelets 348 Thousands/uL      nRBC 0 /100 WBCs      Neutrophils Relative 72 %      Immat GRANS % 0 %      Lymphocytes Relative 20 %      Monocytes Relative 4 %      Eosinophils Relative 3 %      Basophils Relative 1 %      Neutrophils Absolute 4 70 Thousands/µL      Immature Grans Absolute 0 02 Thousand/uL      Lymphocytes Absolute 1 28 Thousands/µL      Monocytes Absolute 0 27 Thousand/µL      Eosinophils Absolute 0 21 Thousand/µL Basophils Absolute 0 03 Thousands/µL                  US gallbladder   Final Result by Carola Hale MD (08/23 0400)      Cholelithiasis  No evidence of gallbladder wall thickening or pericholecystic fluid collection  Please see discussion  Workstation performed: [id number]         CT abdomen pelvis with contrast   Final Result by Carola Hale MD (08/23 0077)      Cholelithiasis  No definite CT evidence of acute cholecystitis  Clinical correlation is recommended  If there is concern for acute gallbladder pathology, gallbladder ultrasound and/or nuclear medicine hepatobiliary imaging could be performed  Prior bariatric surgery  No bowel obstruction  Probable small uterine fibroid(s)  Workstation performed: [id number]                    Procedures  ECG 12 Lead Documentation Only    Date/Time: 8/23/2021 3:01 AM  Performed by: Charly Nguyen DO  Authorized by: Charly Nguyen DO     ECG reviewed by me, the ED Provider: yes    Patient location:  ED  Previous ECG:     Previous ECG:  Unavailable  Interpretation:     Interpretation: normal    Rate:     ECG rate assessment: normal    Rhythm:     Rhythm: sinus rhythm    Ectopy:     Ectopy: none    QRS:     QRS axis:  Normal    QRS intervals:  Normal  Conduction:     Conduction: normal    ST segments:     ST segments:  Normal  T waves:     T waves: normal               ED Course  ED Course as of Aug 23 0740   Mon Aug 23, 2021   0440 Patient with cholelithiasis, no signs of cholecystitis, they have patient still with significant pain, will re-dose pain medication, obtain gallbladder ultrasound and discussed with surgery      0453 Has spoken with ultrasound technician on-call who states that is policy that if no cholecystitis is seen on CT to wait until shift begins rather than call in on-call technician    Patient is currently stable therefore as soon as able will obtain ultrasound to rule out cholecystitis and discuss with surgery management of symptomatic cholelithiasis  0740 Patient care signed out to Dr Willian Clark while awaiting surgical evaluation  MDM  Number of Diagnoses or Management Options  Diagnosis management comments: Patient is a 26-year-old female past medical history of asthma, COPD presenting with abdominal pain  Patient is well-appearing bedside with stable vitals and in no acute distress  She has right upper quadrant abdominal tenderness with no other significant physical exam findings, no CVA tenderness, lungs clear to auscultation, no lower extremity edema and no other significant physical exam findings  Will administer pain control, obtain labs and CT abdomen pelvis to rule out intra-abdominal pathology including cholelithiasis, cholecystitis, pancreatitis, small-bowel obstruction and reassess after pain control  Disposition  Final diagnoses:   Biliary colic     Time reflects when diagnosis was documented in both MDM as applicable and the Disposition within this note     Time User Action Codes Description Comment    8/23/2021  6:44 AM Flako Herman Add [G65 58] Biliary colic       ED Disposition     None      Follow-up Information    None         Patient's Medications   Discharge Prescriptions    No medications on file     No discharge procedures on file      PDMP Review     None          ED Provider  Electronically Signed by           Bard Josias DO  08/23/21 6974

## 2021-08-23 NOTE — ED NOTES
Pt made aware of NPO status due to humberto scheduled for later today      Benedict Gillespie RN  08/23/21 6175

## 2021-08-23 NOTE — ANESTHESIA PREPROCEDURE EVALUATION
Procedure:  CHOLECYSTECTOMY LAPAROSCOPIC (N/A Abdomen)    Relevant Problems   PULMONARY   (+) Sleep apnea    asthma, COPD, gastric sleeve in 2018      Physical Exam    Airway    Mallampati score: III  TM Distance: >3 FB  Neck ROM: full     Dental       Cardiovascular  Cardiovascular exam normal    Pulmonary  Pulmonary exam normal     Other Findings      History Comments History Comments   Obesity  Asthma    COPD (chronic obstructive pulmonary disease) (HCC)  Sleep apnea        Anesthesia Plan  ASA Score- 3     Anesthesia Type- general with ASA Monitors  Additional Monitors:   Airway Plan: ETT  Plan Factors-    Chart reviewed  EKG reviewed  Imaging results reviewed  Existing labs reviewed  Patient summary reviewed  Patient is not a current smoker  Obstructive sleep apnea risk education given perioperatively  Induction- intravenous  Postoperative Plan- Plan for postoperative opioid use  Planned trial extubation    Informed Consent- Anesthetic plan and risks discussed with patient  I personally reviewed this patient with the CRNA  Discussed and agreed on the Anesthesia Plan with the CRNA  Anthony Mclean Abdominal Pain       RUQ pain with belching hx of gastric sleeve       Patient is a 51-year-old female past medical history of asthma, COPD, gastric sleeve in 2018 presenting for abdominal back pain  Patient states that 7:00 p m  Shortly after eating popcorn she began experiencing diffuse upper quadrant abdominal pain which radiates back and forth between her abdomen and back, is constant, described as a pressure, states nothing makes it better worse including Protonix which she took roughly 2 hours ago  She states that she has had similar pain in the past but has never lasted this long  Notes excessive belching but denies any nausea/vomiting/diarrhea/constipation, chest pain, shortness breath, dizziness, fevers, cough, respiratory symptoms, rashes, vision changes, urinary symptoms  Denies any leg pain or swelling, recent cancer diagnosis, recent surgeries, recent traumatic injuries, prior blood clots or coagulopathies, hemoptysis, use of estrogen products

## 2021-08-23 NOTE — ANESTHESIA POSTPROCEDURE EVALUATION
Post-Op Assessment Note    CV Status:  Stable  Pain Score: 0    Pain management: adequate     Mental Status:  Alert and awake   Hydration Status:  Stable   PONV Controlled:  None   Airway Patency:  Patent and adequate      Post Op Vitals Reviewed: Yes      Staff: Anesthesiologist, CRNA         No complications documented      BP   128/61   Temp   97 9   Pulse  62   Resp   16   SpO2   100%

## 2021-08-23 NOTE — OP NOTE
OPERATIVE REPORT  PATIENT NAME: Lynntete Caal    :  1975  MRN: [id number]  Pt Location: [facility] OR ROOM 03    SURGERY DATE: 2021    Surgeon(s) and Role:     * Harpreet Gillespie DO - Primary     * Zurdo Madrid PA-C - Assisting    Preop Diagnosis:  Biliary colic [V61 45]    Post-Op Diagnosis Codes:     * Biliary colic [D24 12]    Procedure(s) (LRB):  CHOLECYSTECTOMY LAPAROSCOPIC (N/A)    Specimen(s):  ID Type Source Tests Collected by Time Destination   1 : Gallbladder and Contents Tissue Gallbladder TISSUE EXAM Harpreet Gillespie DO 2021 1128        Estimated Blood Loss:   Minimal    Drains:  NG/OG/Enteral Tube Orogastric 18 Fr Center mouth (Active)   Number of days: 0       Anesthesia Type:   General    Operative Indications:  Biliary colic [L21 32]    Operative Findings:  Distended gallbladder with no signs of acute inflammation  There was some adhesions to the surrounding fatty tissue and omentum which were taken down meticulously with electrocautery    Complications:   None    Procedure and Technique:  The patient was seen again in Preoperative Holding  All the risks, benefits, complications, treatment options, and expected outcomes were discussed with the patient and family at length  All questions were answered to satisfaction  There was concurrence with the proposed plan and informed consent was obtained  The site of surgery was properly noted/marked  The patient was taken to Operating Room, identified, and the procedure verified as laparoscopic cholecystectomy, possible open  The patient was placed in the supine position on the operating room table  The patient had received preoperative antibiotics and SCDs placed on the bilateral lower extremities  Anesthesia was then induced and the patient was intubated  The abdomen was then prepped and draped in the usual sterile fashion using ChloraPrep    A Time-Out was then performed with all involved present confirming the correct patient, procedure, antibiotics, and any additional concerns  A 1 5 centimeter supraumbilical incision was made in a transverse fashion using a #15 blade and the umbilical stalk was grasped and elevated  Using blunt dissection the fascia was exposed and was incised  Using a large blunt Kareen clamp the peritoneum was entered under direct visualization  A 11 mm port was then placed in the fascial opening and pneumoperitoneum was established  Initial pressure upon establishing pneumoperitoneum was noted to be low  Additional 5 mm ports were placed under direct visualization in the following locations:  Subxiphoid, Right subcostal in the midclavicular line, Right subcostal and anterior axillary line  The patient was then placed in reverse Trendelenburg and left lateral decubitus position  The gallbladder was exposed and was noted to be distended but without signs of acute inflammation  There was some adhesions laterally to omentum and surrounding fat which were taken down meticulously with electrocautery and blunt dissection  The gallbladder fundus was then grasped and retracted cephalad  The gallbladder infundibulum was grasped and retracted cephalad and lateral   Using a combination of blunt dissection using a [place] and the suction  both the cystic duct and cystic artery were exposed and skeletonized  Critical view was then obtained  The cystic duct was clipped using 5 mm clips, 2 clips distally and 1 clip proximally  The cystic duct was then transected using laparoscopic scissors  The cystic artery was clipped using 5 mm clips, 1 clip distally and 2 clips proximally  The cystic artery was then transected using laparoscopic scissors  The gallbladder was then dissected off the liver bed using hook electrocautery  Hemostasis was noted  Both the cystic artery and cystic duct stumps were evaluated and no leakage of bile or blood was noted    The gallbladder was placed in the Endo-Catch bag and removed via the umbilical port  The abdomen was desufflated and the supraumbilical fascial incision was closed with 0 Vicryl in an interrupted fashion using 5 stitches  The abdomen was then reinsufflated and the fascial incision was inspected and noted to be hemostatic without any insufflation leakage  Irrigation was instilled above the liver and in the infra hepatic area and was suctioned until clear  The remaining 5 mm ports were removed and the abdomen was desufflated  Local anesthesia infiltrated in the port sites using 0 25% Marcaine  The port sites were then closed using 4-0 Monocryl  The abdomen was then cleansed and dried and Histoacryl was used to cover the sites  All instrument, sponge, and needle counts were noted to be correct at the conclusion of the case  The patient was brought to the PACU in stable and satisfactory condition       I was present for the entire procedure, A qualified resident physician was not available and A physician assistant was required during the procedure for retraction tissue handling,dissection and suturing    Patient Disposition:  extubated and stable    SIGNATURE: Juaquin Rhodes DO  DATE: August 23, 2021  TIME: 11:32 AM

## 2021-08-23 NOTE — CONSULTS
Consultation - General Surgery   Loni Cai 55 y o  female MRN: [id number]  Unit/Bed#: ED 14 Encounter: [id number]    ASSESSMENT:  55year old F presenting with:  Symptomatic Cholelithasis  -AVSS, one episode of hypotension resolved   -WBC stable 6 51, LFTs WNL  -Received morphine and zofran in ED with minimal relief  -CT: cholelithiasis, GB with no inflammatory changes  -RUQ US: cholelithiasis, no signs of acute cholecystitis, CBD and liver ducts appear normal     PLAN:  -Admission to general surgery service   -Plan for OR today for laparoscopic cholecystectomy   -Surgery details, risks, benefits, and alt tx options discussed with patient  She is willing to undergo operation  Official written consent to be obtained by attending physician  -IV abx on call to OR  -DVT ppx  -Analgesia and antiemetics prn  -IV fluids    _______________________________________________________________  Physician Requesting Consult: Macario Abad MD  Additional consultants:     Reason for Consult / Principal Problem: RUQ pain, Cholelithiasis on CT scan, RUQ US pending       SUBJECTIVE:  CC: Acute onset RUQ pain x 12 hours    HPI: Loni Cai is a 55y o  year old female with PMH obesity s/p gastric sleeve 2018, asthma, COPD, MELVA no longer on CPAP, GERD, and seasonal allergies who presents with sudden onset RUQ pain that began around 7 pm last night after eating popcorn  Pain is constant, severe, 8/10, described as a pressure in the RUQ that radiates across upper abdomen and into the back  No relieving or exacerbating factors  Has similar episodes of biliary colic in the past, however these are shorter and less severe  +chills, no fevers  +nausea, no episodes of emesis  Passing flatus, has been having "floating" stool for months  No sudden changes in BM, no d/c, no hematochezia or melena  Denies any urinary symptoms, CP, palpitations, SOB, cough, or rashes   Prominent gallstone diease in family, she is the only one still with her gallbladder  Fhx liver dz and sister has primary sclerosing cholangitis  Review of Systems:  All ROS reviewed and were negative except as mentioned above in HPI  Historical Information   Past Medical History:   Diagnosis Date    Asthma     COPD [facility]chronic obstructive pulmonary disease) ([facility] [address] )     Obesity     Sleep apnea      Past Surgical History:   Procedure Laterality Date     SECTION  2013    GASTRIC RESTRICTION SURGERY  2018     Social History   Social History     Substance and Sexual Activity   Alcohol Use No     Social History     Substance and Sexual Activity   Drug Use Not Currently    Frequency: 7 0 times per week    Types: Marijuana     Social History     Tobacco Use   Smoking Status Former Smoker   Smokeless Tobacco Never Used   Tobacco Comment    3 years ago          Family History:   Family History   Problem Relation Age of Onset    Liver disease Mother     Heart disease Mother     Heart disease Father     Stroke Father     Diabetes Father        Meds/Allergies   Home meds:   Prior to Admission medications    Medication Sig Start Date End Date Taking?  Authorizing Provider   albuterol (PROVENTIL HFA,VENTOLIN HFA) 90 mcg/act inhaler Inhale 2 puffs every 6 (six) hours as needed 12/27/15   Historical Provider, MD   cetirizine (ZyrTEC) 10 mg tablet Take 10 mg by mouth daily    Historical Provider, MD   famotidine (PEPCID) 40 MG tablet Take 40 mg by mouth 2 (two) times a day as needed 2/16/20 2/15/21  Historical Provider, MD   methocarbamol (ROBAXIN) 500 mg tablet Take 1 tablet (500 mg total) by mouth 3 (three) times a day 8/3/21   Jeanie Smith MD   montelukast (SINGULAIR) 10 mg tablet Take 10 mg by mouth daily 18  Historical Provider, MD   ondansetron (ZOFRAN-ODT) 4 mg disintegrating tablet Take 1 tablet (4 mg total) by mouth every 8 (eight) hours as needed for nausea 8/3/21   Jeanie Smith MD   pantoprazole (PROTONIX) 40 mg tablet take 1 tablet by mouth once daily 4/14/21   Bernard Jolly PA-C   pramipexole (MIRAPEX) 0 25 mg tablet Take 0 25 tablets by mouth daily at bedtime 4/16/18   Historical Provider, MD   torsemide (DEMADEX) 10 mg tablet Take 20 mg by mouth daily 5/16/18 5/16/19  Historical Provider, MD     Scheduled Meds:  Continuous Infusions:No current facility-administered medications for this encounter  PRN Meds:    ALLERGIES:   Allergies   Allergen Reactions    Sulfa Antibiotics GI Intolerance       Objective   Vitals:  Blood pressure 91/57, pulse 62, temperature 97 8 °F (36 6 °C), temperature source Oral, resp  rate 17, height 5' 4" (1 626 m), weight 106 kg (233 lb 11 oz), SpO2 100 %, not currently breastfeeding  Body mass index is 40 11 kg/m²  I/Os:  I/O       08/21 0701 - 08/22 0700 08/22 0701 - 08/23 0700 08/23 0701 - 08/24 0700    IV Piggyback  1000     Total Intake(mL/kg)  1000 (9 4)     Net  +1000                  Invasive Lines/Tubes:  Invasive Devices     Peripheral Intravenous Line            Peripheral IV 08/23/21 Left Antecubital <1 day                Physical Exam:  General Appearance: Awake and alert, in no acute distress, appears stated age  Head: Normocephalic, atraumatic  Eyes: PERRL, conjunctiva pink, sclera white, no scleral icterus   Nose: Septum midline, no visible nasal discharge  Mouth/Throat: Oropharynx pink, moist mucous membranes  Neck: Supple, midline  Lungs: CTA bilaterally, normal lung effort  Cardiovascular: RRR, normal S1 and S2, no murmurs noted   Abdomen: No distension, soft, tender to palpation in RUQ, no guarding or rebound tenderness, no rigidity, Negative Healy's sign, active bowel sounds   : No CVA tenderness, no suprapubic tenderness  Extremities: No calf tenderness, no LE edema  Skin: Warm, dry  No rashes or lesions  No jaundice    Neuro: AAOx3, clear speech   Psych: Normal affect      Lab Results and Cultures:   CBC:   Results from last 7 days   Lab Units 08/23/21  0237   WBC Thousand/uL 6 51 HEMOGLOBIN g/dL 12 0   HEMATOCRIT % 36 4   PLATELETS Thousands/uL 164     BMP/CMP:  Results from last 7 days   Lab Units 08/23/21  0237   POTASSIUM mmol/L 3 8   CHLORIDE mmol/L 108   CO2 mmol/L 28   BUN mg/dL 9   CREATININE mg/dL 0 68   CALCIUM mg/dL 7 5*     Coags:     Lipid panel:     HgbA1c:   Lab Results   Component Value Date    HGBA1C 5 3 08/07/2021    HGBA1C 5 6 02/15/2018     Urinalysis:   Lab Results   Component Value Date    COLORU Yellow 09/10/2018    CLARITYU Slightly Cloudy 09/10/2018    SPECGRAV 1 010 09/10/2018    PHUR 7 0 09/10/2018    LEUKOCYTESUR Negative 09/10/2018    NITRITE Negative 09/10/2018    GLUCOSEU Negative 09/10/2018    KETONESU Negative 09/10/2018    BILIRUBINUR Negative 09/10/2018    BLOODU Large (A) 09/10/2018   ,   Urine Culture: No results found for: URINECX  Wound Culure: No results found for: WOUNDCULT  Blood Culture: No results found for: BLOODCX      Imaging Studies: I have personally reviewed pertinent reports  US gallbladder    Result Date: 8/23/2021  Impression: Cholelithiasis  No evidence of gallbladder wall thickening or pericholecystic fluid collection  Please see discussion  Workstation performed: [id number]     CT abdomen pelvis with contrast    Result Date: 8/23/2021  Impression: Cholelithiasis  No definite CT evidence of acute cholecystitis  Clinical correlation is recommended  If there is concern for acute gallbladder pathology, gallbladder ultrasound and/or nuclear medicine hepatobiliary imaging could be performed  Prior bariatric surgery  No bowel obstruction  Probable small uterine fibroid(s)  Workstation performed: [id number]      EKG, Pathology, and Other Studies: I have personally reviewed pertinent reports  VTE Prophylaxis: Enoxaparin (Lovenox)     Code Status: No Order  Advance Directive and Living Will:      Power of :    POLST:      Counseling / Coordination of Care  Counseling/Coordination of Care:  Total floor / unit time spent today 20 minutes  Greater than 50% of total time was spent with the patient and / or family counseling and / or coordination of care  A description of the counseling / coordination of care: discussed dx and plan with patient, attending, and ED RN          Faisal Barber [place]  8/23/2021

## 2021-09-07 ENCOUNTER — OFFICE VISIT (OUTPATIENT)
Dept: SURGERY | Facility: CLINIC | Age: 46
End: 2021-09-07

## 2021-09-07 VITALS
DIASTOLIC BLOOD PRESSURE: 68 MMHG | TEMPERATURE: 97.6 F | SYSTOLIC BLOOD PRESSURE: 116 MMHG | RESPIRATION RATE: 16 BRPM | HEART RATE: 68 BPM | HEIGHT: 64 IN | WEIGHT: 239 LBS | BODY MASS INDEX: 40.8 KG/M2

## 2021-09-07 DIAGNOSIS — K80.50 BILIARY COLIC: ICD-10-CM

## 2021-09-07 DIAGNOSIS — K80.20 CALCULUS OF GALLBLADDER WITHOUT CHOLECYSTITIS WITHOUT OBSTRUCTION: Primary | ICD-10-CM

## 2021-09-07 PROCEDURE — 99024 POSTOP FOLLOW-UP VISIT: CPT | Performed by: STUDENT IN AN ORGANIZED HEALTH CARE EDUCATION/TRAINING PROGRAM

## 2021-09-07 RX ORDER — LIDOCAINE 50 MG/G
PATCH TOPICAL
COMMUNITY
Start: 2021-08-04

## 2021-09-07 NOTE — PROGRESS NOTES
Assessment/Plan:   80-year-old female status post laparoscopic cholecystectomy on 08/23  - patient is tolerating a diet well and having normal bowel function  - denies any abdominal pain  - incisions healing well without erythema induration or drainage  - no heavy lifting greater than 15 lb for total of 4 weeks after surgery  - follow-up in office as needed    Pathology Results:  Final Diagnosis   A  Gallbladder, cholecystectomy:       - Chronic cholecystitis  - Cholelithiasis  - No dysplasia or malignancy is identified  I reviewed the pathology report and discussed the findings with the patient and their accompanying family or caregiver, if present  All questions were answered to satisfaction and the patient along with family or caregiver, if present, voiced understanding  1  Calculus of gallbladder without cholecystitis without obstruction    2  Biliary colic               Subjective:      Patient ID: Elizabeth Mcknight is a 55 y o  female  Triage Notes:     Patient is a 80-year-old female who presents to office for evaluation status post laparoscopic cholecystectomy  She is tolerating a diet well and having normal bowel function  She denies any abdominal pain  She does states she had a little bit of drainage from her umbilical incision but this was minimal and has resolved  The following portions of the patient's history were reviewed and updated as appropriate: allergies, current medications, past family history, past medical history, past social history, past surgical history and problem list     Review of Systems   Constitutional: Negative for chills, fatigue and fever  HENT: Negative for congestion, hearing loss, rhinorrhea and sore throat  Eyes: Negative for pain and discharge  Respiratory: Negative for cough, chest tightness and shortness of breath  Cardiovascular: Negative for chest pain and palpitations     Gastrointestinal: Negative for abdominal pain, constipation, diarrhea, nausea and vomiting  Endocrine: Negative for cold intolerance and heat intolerance  Genitourinary: Negative for difficulty urinating and dysuria  Musculoskeletal: Negative for back pain and neck pain  Skin: Negative for color change and rash  Allergic/Immunologic: Negative for environmental allergies and food allergies  Neurological: Negative for seizures and headaches  Hematological: Negative for adenopathy  Does not bruise/bleed easily  Psychiatric/Behavioral: Negative for confusion and hallucinations  Objective:      /68 (BP Location: Left arm, Patient Position: Sitting, Cuff Size: Standard)   Pulse 68   Temp 97 6 °F (36 4 °C) (Temporal)   Resp 16   Ht 5' 4" (1 626 m)   Wt 108 kg (239 lb)   LMP 08/11/2021   BMI 41 02 kg/m²     Below is the patient's most recent value for Albumin, ALT, AST, BUN, Calcium, Chloride, Cholesterol, CO2, Creatinine, GFR, Glucose, HDL, Hematocrit, Hemoglobin, Hemoglobin A1C, LDL, Magnesium, Phosphorus, Platelets, Potassium, PSA, Sodium, Triglycerides, and WBC  Lab Results   Component Value Date    ALT 18 08/23/2021    AST 14 08/23/2021    BUN 9 08/23/2021    CALCIUM 7 5 (L) 08/23/2021     08/23/2021    CO2 28 08/23/2021    CREATININE 0 68 08/23/2021    HCT 36 4 08/23/2021    HGB 12 0 08/23/2021    HGBA1C 5 3 08/07/2021    MG 1 7 08/23/2021     08/23/2021    K 3 8 08/23/2021    WBC 6 51 08/23/2021     Note: for a comprehensive list of the patient's lab results, access the Results Review activity  Physical Exam  Constitutional:       Appearance: Normal appearance  HENT:      Head: Normocephalic and atraumatic  Nose: Nose normal    Eyes:      General: No scleral icterus  Conjunctiva/sclera: Conjunctivae normal    Cardiovascular:      Rate and Rhythm: Normal rate  Pulmonary:      Effort: Pulmonary effort is normal    Abdominal:      General: There is no distension  Palpations: Abdomen is soft  Tenderness: There is no abdominal tenderness  Comments: Laparoscopic incisions healing well without erythema induration or drainage   Musculoskeletal:         General: No signs of injury  Skin:     General: Skin is warm  Coloration: Skin is not jaundiced  Neurological:      General: No focal deficit present  Mental Status: She is alert and oriented to person, place, and time     Psychiatric:         Mood and Affect: Mood normal          Behavior: Behavior normal

## 2022-11-09 ENCOUNTER — OFFICE VISIT (OUTPATIENT)
Dept: FAMILY MEDICINE CLINIC | Facility: CLINIC | Age: 47
End: 2022-11-09

## 2022-11-09 VITALS
SYSTOLIC BLOOD PRESSURE: 126 MMHG | OXYGEN SATURATION: 98 % | HEART RATE: 86 BPM | HEIGHT: 64 IN | BODY MASS INDEX: 40.29 KG/M2 | DIASTOLIC BLOOD PRESSURE: 80 MMHG | WEIGHT: 236 LBS

## 2022-11-09 DIAGNOSIS — K21.9 GASTROESOPHAGEAL REFLUX DISEASE WITHOUT ESOPHAGITIS: ICD-10-CM

## 2022-11-09 DIAGNOSIS — J45.909 MILD ASTHMA WITHOUT COMPLICATION, UNSPECIFIED WHETHER PERSISTENT: ICD-10-CM

## 2022-11-09 DIAGNOSIS — Z76.89 ENCOUNTER TO ESTABLISH CARE WITH NEW DOCTOR: ICD-10-CM

## 2022-11-09 DIAGNOSIS — R41.840 CONCENTRATION DEFICIT: Primary | ICD-10-CM

## 2022-11-09 DIAGNOSIS — G25.81 RLS (RESTLESS LEGS SYNDROME): ICD-10-CM

## 2022-11-09 DIAGNOSIS — G47.33 OSA (OBSTRUCTIVE SLEEP APNEA): ICD-10-CM

## 2022-11-09 DIAGNOSIS — Z13.6 SCREENING FOR CARDIOVASCULAR CONDITION: ICD-10-CM

## 2022-11-09 DIAGNOSIS — Z00.00 ANNUAL PHYSICAL EXAM: ICD-10-CM

## 2022-11-09 DIAGNOSIS — Z12.31 ENCOUNTER FOR SCREENING MAMMOGRAM FOR MALIGNANT NEOPLASM OF BREAST: ICD-10-CM

## 2022-11-09 DIAGNOSIS — Z13.1 SCREENING FOR DIABETES MELLITUS: ICD-10-CM

## 2022-11-09 DIAGNOSIS — Z98.84 HISTORY OF BARIATRIC SURGERY: ICD-10-CM

## 2022-11-09 PROBLEM — K80.50 BILIARY COLIC: Status: RESOLVED | Noted: 2021-08-23 | Resolved: 2022-11-09

## 2022-11-09 PROBLEM — G47.30 SLEEP APNEA: Status: RESOLVED | Noted: 2021-08-23 | Resolved: 2022-11-09

## 2022-11-09 RX ORDER — FLUTICASONE PROPIONATE AND SALMETEROL 250; 50 UG/1; UG/1
POWDER RESPIRATORY (INHALATION)
COMMUNITY
Start: 2022-09-07

## 2022-11-09 RX ORDER — DEXTROAMPHETAMINE SACCHARATE, AMPHETAMINE ASPARTATE, DEXTROAMPHETAMINE SULFATE AND AMPHETAMINE SULFATE 1.25; 1.25; 1.25; 1.25 MG/1; MG/1; MG/1; MG/1
5 TABLET ORAL 2 TIMES DAILY
Qty: 60 TABLET | Refills: 0 | Status: SHIPPED | OUTPATIENT
Start: 2022-11-09

## 2022-11-09 NOTE — PROGRESS NOTES
Assessment/Plan:         Problem List Items Addressed This Visit        Respiratory    Sleep apnea      Other Visit Diagnoses     Encounter for screening mammogram for malignant neoplasm of breast    -  Primary    Relevant Orders    Mammo screening bilateral w 3d & cad    MELVA (obstructive sleep apnea)        History of bariatric surgery        Relevant Orders    Vitamin D 25 hydroxy    Magnesium    Mild asthma without complication, unspecified whether persistent        Relevant Medications    Fluticasone-Salmeterol (Advair) 250-50 mcg/dose inhaler    RLS (restless legs syndrome)        Gastroesophageal reflux disease without esophagitis        Encounter to establish care with new doctor        Annual physical exam        Screening for diabetes mellitus        Screening for cardiovascular condition        BMI 40 0-44 9, adult ([facility] )        Relevant Orders    Lipid panel    Comprehensive metabolic panel    CBC and differential    TSH, 3rd generation with Free T4 reflex    HEMOGLOBIN A1C W/ EAG ESTIMATION    Concentration deficit        Relevant Medications    amphetamine-dextroamphetamine (ADDERALL, 5MG,) 5 MG tablet            Subjective:      Patient ID: Viviane Pryor is a 52 y o  female  HPI    Coming to establish care    Would like to discuss adhd, has seen psychiatry however was frustrated with care  Was on multiple medications for adhd and depression (even diagnosed with bipolar) however patient is frustrated  Cant stay organized, stick with routines  Was on straterra and wellbutrin but stopped due to no improvement and becoming angry   buspirone    The following portions of the patient's history were reviewed and updated as appropriate:   Past Medical History:  She has a past medical history of Asthma, COPD (chronic obstructive pulmonary disease) ([facility] ), Obesity, and Sleep apnea ,  _______________________________________________________________________  Medical Problems:  does not have any pertinent problems on file ,  _______________________________________________________________________  Past Surgical History:   has a past surgical history that includes Gastric restriction surgery (2018);  section (); and CHOLECYSTECTOMY LAPAROSCOPIC (N/A, 2021)  ,  _______________________________________________________________________  Family History:  family history includes Autoimmune disease in her mother and sister; Coronary artery disease in her mother; Diabetes in her father; Heart disease in her father and mother; Hypertension in her father; Liver disease in her mother; Stroke in her father ,  _______________________________________________________________________  Social History:   reports that she has quit smoking  She has never used smokeless tobacco  She reports previous drug use  She reports that she does not drink alcohol ,  _______________________________________________________________________  Allergies:  is allergic to sulfa antibiotics     _______________________________________________________________________  Current Outpatient Medications   Medication Sig Dispense Refill   • albuterol (PROVENTIL HFA,VENTOLIN HFA) 90 mcg/act inhaler Inhale 2 puffs every 6 (six) hours as needed     • amphetamine-dextroamphetamine (ADDERALL, 5MG,) 5 MG tablet Take 1 tablet (5 mg total) by mouth 2 (two) times a day Max Daily Amount: 10 mg 60 tablet 0   • cetirizine (ZyrTEC) 10 mg tablet Take 10 mg by mouth daily     • Fluticasone-Salmeterol (Advair) 250-50 mcg/dose inhaler inhale 1 puff by mouth and INTO THE LUNGS twice a day     • pantoprazole (PROTONIX) 40 mg tablet take 1 tablet by mouth once daily 90 tablet 3   • pramipexole (MIRAPEX) 0 25 mg tablet Take 0 25 tablets by mouth daily at bedtime     • lidocaine (LIDODERM) 5 % PLACE 1 PATCH ON THE SKIN ONCE DAILY FOR 12 HOURS FOR 7 DAYS SAM     (REFER TO PRESCRIPTION NOTES)   (Patient not taking: Reported on 2022)     • montelukast (SINGULAIR) 10 mg tablet Take 10 mg by mouth daily     • ondansetron (ZOFRAN-ODT) 4 mg disintegrating tablet Take 1 tablet (4 mg total) by mouth every 8 (eight) hours as needed for nausea (Patient not taking: Reported on 11/9/2022) 20 tablet 0     No current facility-administered medications for this visit      _______________________________________________________________________  Review of Systems      Objective:  Vitals:    11/09/22 1119   BP: 126/80   BP Location: Left arm   Patient Position: Sitting   Cuff Size: Standard   Pulse: 86   SpO2: 98%   Weight: 107 kg (236 lb)   Height: 5' 4" (1 626 m)     Body mass index is 40 51 kg/m²       Physical Exam

## 2022-11-09 NOTE — PROGRESS NOTES
Name: Neida Garcia      : 1975      MRN: [id number]  Encounter Provider: Xochitl Ramos MD  Encounter Date: 2022   Encounter department: [facility] [place] [id number] [address] [id number]     1  Concentration deficit  Assessment & Plan:  Broad differential, may be underlying adhd vs atypical depression or anxiety  Trial of stimulant and close follow up    Orders:  -     amphetamine-dextroamphetamine (ADDERALL, 5MG,) 5 MG tablet; Take 1 tablet (5 mg total) by mouth 2 (two) times a day Max Daily Amount: 10 mg  -     [facility] Prescribed Meds and Special Instructions  -     Amphetamines, Methamphetamines  -     Butalbital  -     Phenobarbital  -     Secobarbital  -     Temazepam  -     Alprazolam  -     Clonazepam  -     Diazepam  -     Lorazepam  -     Oxazepam  -     Gabapentin  -     Pregabalin  -     Cocaine  -     Heroin  -     Buprenorphine  -     Levorphanol  -     Meperidine  -     Naltrexone  -     Fentanyl  -     Methadone  -     Oxycodone  -     Oxymorphone  -     Tapentadol  -     THC  -     Tramadol  -     Codeine, Hydrocodone, Hydropmorphone, Morphine  -     Bath Salts  -     Ethyl Glucuronide/Ethyl Sulfate  -     Kratom  -     Spice  -     Methylphenidate  -     Phentermine  -     Validity Oxidant  -     Validity Creatinine  -     Validity pH  -     Validity Specific    2  Encounter for screening mammogram for malignant neoplasm of breast  -     Mammo screening bilateral w 3d & cad; Future; Expected date: 2022    3  MELVA (obstructive sleep apnea)    4  Primary sleep apnea of , unspecified type    5  History of bariatric surgery  -     Vitamin D 25 hydroxy; Future  -     Magnesium; Future    6  Mild asthma without complication, unspecified whether persistent  Assessment & Plan:  Stable on advair, singulair, and albuterol  Flu vaccine encouraged, declines today      7   RLS (restless legs syndrome)  Assessment & Plan:  Stable on mirapex      8  Gastroesophageal reflux disease without esophagitis  Assessment & Plan:  Stable on protonix      9  Encounter to establish care with new doctor    10  Annual physical exam    11  Screening for diabetes mellitus    12  Screening for cardiovascular condition    13  BMI 40 0-44 9, adult ([facility] [address] )  -     Lipid panel; Future  -     Comprehensive metabolic panel; Future  -     CBC and differential; Future  -     TSH, 3rd generation with Free T4 reflex; Future  -     HEMOGLOBIN A1C W/ EAG ESTIMATION; Future      BMI Counseling: Body mass index is 40 51 kg/m²  The BMI is above normal  Nutrition recommendations include decreasing portion sizes, encouraging healthy choices of fruits and vegetables, decreasing fast food intake, consuming healthier snacks, limiting drinks that contain sugar and moderation in carbohydrate intake  Exercise recommendations include moderate physical activity 150 minutes/week, exercising 3-5 times per week and strength training exercises  No pharmacotherapy was ordered  Rationale for BMI follow-up plan is due to patient being overweight or obese  Depression Screening and Follow-up Plan: Patient was screened for depression during today's encounter  They screened negative with a PHQ-2 score of 0  Subjective      Coming to establish care    Would like to discuss adhd, has seen psychiatry however was frustrated with care  Was on multiple medications for adhd and depression (even diagnosed with bipolar) however patient is frustrated  Cant stay organized, stick with routines  Was on straterra and wellbutrin but stopped due to no improvement and becoming angry  Buspirone  Multiple family members with psych diagnosis (adhd included)    Stable on mirapex for RLS    Asthma has been stable    Hx of gastric sleeve  Uses suppliments for vitamins to help   Her lowest weight was 180 since she stopped working out when covid occured    Review of Systems   Constitutional: Negative for chills, fatigue and fever  HENT: Negative for rhinorrhea and sore throat  Eyes: Negative for visual disturbance  Respiratory: Negative for cough and shortness of breath  Cardiovascular: Negative for chest pain and palpitations  Gastrointestinal: Negative for abdominal pain, constipation, diarrhea, nausea and vomiting  Genitourinary: Negative for difficulty urinating, dysuria and frequency  Musculoskeletal: Negative for arthralgias and myalgias  Skin: Negative for color change and rash  Neurological: Negative for weakness and headaches  Psychiatric/Behavioral: Positive for behavioral problems and decreased concentration  Current Outpatient Medications on File Prior to Visit   Medication Sig   • albuterol (PROVENTIL HFA,VENTOLIN HFA) 90 mcg/act inhaler Inhale 2 puffs every 6 (six) hours as needed   • cetirizine (ZyrTEC) 10 mg tablet Take 10 mg by mouth daily   • Fluticasone-Salmeterol (Advair) 250-50 mcg/dose inhaler inhale 1 puff by mouth and INTO THE LUNGS twice a day   • pantoprazole (PROTONIX) 40 mg tablet take 1 tablet by mouth once daily   • pramipexole (MIRAPEX) 0 25 mg tablet Take 0 25 tablets by mouth daily at bedtime   • lidocaine (LIDODERM) 5 % PLACE 1 PATCH ON THE SKIN ONCE DAILY FOR 12 HOURS FOR 7 DAYS SAM     (REFER TO PRESCRIPTION NOTES)   (Patient not taking: Reported on 11/9/2022)   • montelukast (SINGULAIR) 10 mg tablet Take 10 mg by mouth daily   • ondansetron (ZOFRAN-ODT) 4 mg disintegrating tablet Take 1 tablet (4 mg total) by mouth every 8 (eight) hours as needed for nausea (Patient not taking: Reported on 11/9/2022)   • [DISCONTINUED] famotidine (PEPCID) 40 MG tablet Take 40 mg by mouth 2 (two) times a day as needed   • [DISCONTINUED] methocarbamol (ROBAXIN) 500 mg tablet Take 1 tablet (500 mg total) by mouth 3 (three) times a day   • [DISCONTINUED] torsemide (DEMADEX) 10 mg tablet Take 20 mg by mouth daily       Objective     /80 (BP Location: Left arm, Patient Position: Sitting, Cuff Size: Standard)   Pulse 86   Ht 5' 4" (1 626 m)   Wt 107 kg (236 lb)   SpO2 98%   BMI 40 51 kg/m²     Physical Exam  Constitutional:       General: She is not in acute distress  Appearance: Normal appearance  She is obese  She is not ill-appearing  HENT:      Head: Normocephalic and atraumatic  Right Ear: External ear normal       Left Ear: External ear normal       Nose: Nose normal  No congestion or rhinorrhea  Mouth/Throat:      Mouth: Mucous membranes are moist       Pharynx: Oropharynx is clear  No oropharyngeal exudate or posterior oropharyngeal erythema  Eyes:      Extraocular Movements: Extraocular movements intact  Conjunctiva/sclera: Conjunctivae normal       Pupils: Pupils are equal, round, and reactive to light  Cardiovascular:      Rate and Rhythm: Normal rate and regular rhythm  Pulses: Normal pulses  Heart sounds: No murmur heard  Pulmonary:      Effort: Pulmonary effort is normal  No respiratory distress  Breath sounds: Normal breath sounds  No wheezing  Chest:      Chest wall: No tenderness  Abdominal:      General: Bowel sounds are normal       Palpations: Abdomen is soft  Tenderness: There is no abdominal tenderness  Musculoskeletal:         General: Normal range of motion  Cervical back: Normal range of motion  Skin:     General: Skin is warm and dry  Capillary Refill: Capillary refill takes less than 2 seconds  Findings: No rash  Neurological:      General: No focal deficit present  Mental Status: She is alert  Mental status is at baseline  Psychiatric:         Attention and Perception: Attention and perception normal          Mood and Affect: Mood is anxious  Affect is tearful  Speech: Speech is rapid and pressured  Behavior: Behavior normal        Liliam Vicente MD  BMI Counseling: Body mass index is 40 51 kg/m²   The BMI is above normal  Nutrition recommendations include reducing portion sizes, decreasing overall calorie intake, reducing fast food intake, decreasing soda and/or juice intake and increasing intake of lean protein  Exercise recommendations include moderate aerobic physical activity for 150 minutes/week and exercising 3-5 times per week

## 2022-11-09 NOTE — ASSESSMENT & PLAN NOTE
Broad differential, may be underlying adhd vs atypical depression or anxiety   Trial of stimulant and close follow up

## 2022-11-09 NOTE — PROGRESS NOTES
[address]     NAME: Nelglynnedil   AGE: 52 y o  SEX: female  : 1975     DATE: 2022     Assessment and Plan:     Problem List Items Addressed This Visit        Digestive    Gastroesophageal reflux disease without esophagitis     Stable on protonix            Respiratory    Mild asthma without complication     Stable on advair, singulair, and albuterol  Flu vaccine encouraged, declines today         Relevant Medications    Fluticasone-Salmeterol (Advair) 250-50 mcg/dose inhaler    RESOLVED: Sleep apnea       Other    History of bariatric surgery    Relevant Orders    Vitamin D 25 hydroxy    Magnesium    RLS (restless legs syndrome)     Stable on mirapex         Concentration deficit - Primary     Broad differential, may be underlying adhd vs atypical depression or anxiety   Trial of stimulant and close follow up         Relevant Medications    amphetamine-dextroamphetamine (ADDERALL, 5MG,) 5 MG tablet    Other Relevant Orders    [facility] Prescribed Meds and Special Instructions    Amphetamines, Methamphetamines    Butalbital    Phenobarbital    Secobarbital    Temazepam    Alprazolam    Clonazepam    Diazepam    Lorazepam    Oxazepam    Gabapentin    Pregabalin    Cocaine    Heroin    Buprenorphine    Levorphanol    Meperidine    Naltrexone    Fentanyl    Methadone    Oxycodone    Oxymorphone    Tapentadol    THC    Tramadol    Codeine, Hydrocodone, Hydropmorphone, Morphine    Bath Salts    Ethyl Glucuronide/Ethyl Sulfate    Kratom    Spice    Methylphenidate    Phentermine    Validity Oxidant    Validity Creatinine    Validity pH    Validity Specific      Other Visit Diagnoses     Encounter for screening mammogram for malignant neoplasm of breast        Relevant Orders    Mammo screening bilateral w 3d & cad    MELVA (obstructive sleep apnea)        Encounter to establish care with new doctor Annual physical exam        Screening for diabetes mellitus        Screening for cardiovascular condition        BMI 40 0-44 9, adult ([facility])        Relevant Orders    Lipid panel    Comprehensive metabolic panel    CBC and differential    TSH, 3rd generation with Free T4 reflex    HEMOGLOBIN A1C W/ EAG ESTIMATION          Immunizations and preventive care screenings were discussed with patient today  Appropriate education was printed on patient's after visit summary  Counseling:  Exercise: the importance of regular exercise/physical activity was discussed  Recommend exercise 3-5 times per week for at least 30 minutes  Depression Screening and Follow-up Plan: Patient was screened for depression during today's encounter  They screened negative with a PHQ-2 score of 0  Return in about 3 months (around 2/9/2023) for Recheck  Chief Complaint:     Chief Complaint   Patient presents with   • New Patient Visit     Pt wants to discuss ADHD  • Physical Exam      History of Present Illness:     Adult Annual Physical   Patient here for a comprehensive physical exam  The patient reports problems - see problem based visit  Diet and Physical Activity  Diet/Nutrition: poor diet  Exercise: no formal exercise  Depression Screening  PHQ-2/9 Depression Screening    Little interest or pleasure in doing things: 0 - not at all  Feeling down, depressed, or hopeless: 0 - not at all  PHQ-2 Score: 0  PHQ-2 Interpretation: Negative depression screen       General Health  Sleep: sleeps well  Hearing: normal - bilateral   Vision: no vision problems, goes for regular eye exams and wears glasses  Dental: regular dental visits  /GYN Health  Patient is: premenopausal       Review of Systems:     Review of Systems   Constitutional: Negative for chills, fatigue and fever  HENT: Negative for rhinorrhea and sore throat  Eyes: Negative for visual disturbance     Respiratory: Negative for cough and shortness of breath  Cardiovascular: Negative for chest pain and palpitations  Gastrointestinal: Negative for abdominal pain, constipation, diarrhea, nausea and vomiting  Genitourinary: Negative for difficulty urinating, dysuria and frequency  Musculoskeletal: Negative for arthralgias and myalgias  Skin: Negative for color change and rash  Neurological: Negative for weakness and headaches  Psychiatric/Behavioral: Positive for behavioral problems and decreased concentration        Past Medical History:     Past Medical History:   Diagnosis Date   • Asthma    • Biliary colic 5197   • [facility] disease[facility]    • Obesity    • Sleep apnea    • Sleep apnea 2021      Past Surgical History:     Past Surgical History:   Procedure Laterality Date   •  SECTION     • CHOLECYSTECTOMY LAPAROSCOPIC N/A 2021    Procedure: CHOLECYSTECTOMY LAPAROSCOPIC;  Surgeon: Rigo Sim DO;  Location: [facility];  Service: General   • GASTRIC RESTRICTION SURGERY  2018      Social History:     Social History     Socioeconomic History   • Marital status: /Civil Union     Spouse name: None   • Number of children: None   • Years of education: None   • Highest education level: None   Occupational History   • None   Tobacco Use   • Smoking status: Former Smoker   • Smokeless tobacco: Never Used   • Tobacco comment: 2015   Vaping Use   • Vaping Use: Never used   Substance and Sexual Activity   • Alcohol use: No   • Drug use: Not Currently   • Sexual activity: Yes     Partners: Male   Other Topics Concern   • None   Social History Narrative   • None     Social Determinants of Health     Financial Resource Strain: Not on file   Food Insecurity: Not on file   Transportation Needs: Not on file   Physical Activity: Not on file   Stress: Not on file   Social Connections: Not on file   Intimate Partner Violence: Not on file   Housing Stability: Not on file      Family History:     Family History   Problem Relation Age of Onset   • Coronary artery disease Mother    • Autoimmune disease Mother         liver   • Liver disease Mother    • Heart disease Mother    • Hypertension Father    • Heart disease Father    • Stroke Father    • Diabetes Father    • Autoimmune disease Sister         lupus and liver      Current Medications:     Current Outpatient Medications   Medication Sig Dispense Refill   • albuterol (PROVENTIL HFA,VENTOLIN HFA) 90 mcg/act inhaler Inhale 2 puffs every 6 (six) hours as needed     • amphetamine-dextroamphetamine (ADDERALL, 5MG,) 5 MG tablet Take 1 tablet (5 mg total) by mouth 2 (two) times a day Max Daily Amount: 10 mg 60 tablet 0   • cetirizine (ZyrTEC) 10 mg tablet Take 10 mg by mouth daily     • Fluticasone-Salmeterol (Advair) 250-50 mcg/dose inhaler inhale 1 puff by mouth and INTO THE LUNGS twice a day     • pantoprazole (PROTONIX) 40 mg tablet take 1 tablet by mouth once daily 90 tablet 3   • pramipexole (MIRAPEX) 0 25 mg tablet Take 0 25 tablets by mouth daily at bedtime     • lidocaine (LIDODERM) 5 % PLACE 1 PATCH ON THE SKIN ONCE DAILY FOR 12 HOURS FOR 7 DAYS SAM     (REFER TO PRESCRIPTION NOTES)  (Patient not taking: Reported on 11/9/2022)     • montelukast (SINGULAIR) 10 mg tablet Take 10 mg by mouth daily     • ondansetron (ZOFRAN-ODT) 4 mg disintegrating tablet Take 1 tablet (4 mg total) by mouth every 8 (eight) hours as needed for nausea (Patient not taking: Reported on 11/9/2022) 20 tablet 0     No current facility-administered medications for this visit  Allergies: Allergies   Allergen Reactions   • Sulfa Antibiotics GI Intolerance      Physical Exam:     /80 (BP Location: Left arm, Patient Position: Sitting, Cuff Size: Standard)   Pulse 86   Ht 5' 4" (1 626 m)   Wt 107 kg (236 lb)   SpO2 98%   BMI 40 51 kg/m²     Physical Exam  Constitutional:       General: She is not in acute distress  Appearance: Normal appearance  She is obese  She is not ill-appearing  HENT:      Head: Normocephalic and atraumatic  Right Ear: Tympanic membrane, ear canal and external ear normal       Left Ear: Tympanic membrane, ear canal and external ear normal       Nose: Nose normal       Mouth/Throat:      Mouth: Mucous membranes are moist       Pharynx: Oropharynx is clear  No oropharyngeal exudate or posterior oropharyngeal erythema  Eyes:      General: No scleral icterus  Right eye: No discharge  Left eye: No discharge  Extraocular Movements: Extraocular movements intact  Conjunctiva/sclera: Conjunctivae normal       Pupils: Pupils are equal, round, and reactive to light  Cardiovascular:      Rate and Rhythm: Normal rate and regular rhythm  Pulses: Normal pulses  Heart sounds: Normal heart sounds  No murmur heard  Pulmonary:      Effort: Pulmonary effort is normal  No respiratory distress  Breath sounds: Normal breath sounds  Abdominal:      General: Bowel sounds are normal       Palpations: Abdomen is soft  Tenderness: There is no abdominal tenderness  Musculoskeletal:         General: Normal range of motion  Cervical back: Normal range of motion and neck supple  Lymphadenopathy:      Cervical: No cervical adenopathy  Skin:     General: Skin is warm and dry  Capillary Refill: Capillary refill takes less than 2 seconds  Neurological:      General: No focal deficit present  Mental Status: She is alert and oriented to person, place, and time  Mental status is at baseline  Cranial Nerves: No cranial nerve deficit  Psychiatric:         Attention and Perception: Perception normal          Mood and Affect: Mood is anxious  Affect is tearful  Speech: Speech is rapid and pressured            MD [facility] [phone]

## 2022-11-09 NOTE — PROGRESS NOTES
Assessment/Plan     Problem List Items Addressed This Visit        Respiratory    Sleep apnea      Other Visit Diagnoses     Concentration deficit    -  Primary    Relevant Medications    amphetamine-dextroamphetamine (ADDERALL, 5MG,) 5 MG tablet    Other Relevant Orders    [facility] All Prescribed Meds and Special Instructions    Amphetamines, Methamphetamines    Butalbital    Phenobarbital    Secobarbital    Temazepam    Alprazolam    Clonazepam    Diazepam    Lorazepam    Oxazepam    Gabapentin    Pregabalin    Cocaine    Heroin    Buprenorphine    Levorphanol    Meperidine    Naltrexone    Fentanyl    Methadone    Oxycodone    Oxymorphone    Tapentadol    THC    Tramadol    Codeine, Hydrocodone, Hydropmorphone, Morphine    Bath Salts    Ethyl Glucuronide/Ethyl Sulfate    Kratom    Spice    Methylphenidate    Phentermine    Validity Oxidant    Validity Creatinine    Validity pH    Validity Specific    Encounter for screening mammogram for malignant neoplasm of breast        Relevant Orders    Mammo screening bilateral w 3d & cad    MELVA (obstructive sleep apnea)        History of bariatric surgery        Relevant Orders    Vitamin D 25 hydroxy    Magnesium    Mild asthma without complication, unspecified whether persistent        Relevant Medications    Fluticasone-Salmeterol (Advair) 250-50 mcg/dose inhaler    RLS (restless legs syndrome)        Gastroesophageal reflux disease without esophagitis        Encounter to establish care with new doctor        Annual physical exam        Screening for diabetes mellitus        Screening for cardiovascular condition        BMI 40 0-44 9, adult ([facility])        Relevant Orders    Lipid panel    Comprehensive metabolic panel    CBC and differential    TSH, 3rd generation with Free T4 reflex    HEMOGLOBIN A1C W/ EAG ESTIMATION         Opioid Management Plan      Subjective     Opioid Management HPI  HPI       PDMP Review       Value Time User    PDMP Reviewed  Yes 11/9/2022 11:32 AM Pardeep Beaver MD         Review of Systems  Objective     /80 (BP Location: Left arm, Patient Position: Sitting, Cuff Size: Standard)   Pulse 86   Ht 5' 4" (1 626 m)   Wt 107 kg (236 lb)   SpO2 98%   BMI 40 51 kg/m²     Physical Exam    Pardeep Beaver MD

## 2022-11-12 LAB
6MAM UR QL CFM: NEGATIVE NG/ML
7AMINOCLONAZEPAM UR QL CFM: NEGATIVE NG/ML
A-OH ALPRAZ UR QL CFM: NEGATIVE NG/ML
ACCEPTABLE CREAT UR QL: NORMAL MG/DL
ACCEPTIBLE SP GR UR QL: NORMAL
AMPHET UR QL CFM: ABNORMAL NG/ML
BUPRENORPHINE UR QL CFM: NEGATIVE NG/ML
BUTALBITAL UR QL CFM: NEGATIVE NG/ML
BZE UR QL CFM: NEGATIVE NG/ML
CODEINE UR QL CFM: NEGATIVE NG/ML
EDDP UR QL CFM: NEGATIVE NG/ML
ETHYL GLUCURONIDE UR QL CFM: NEGATIVE NG/ML
ETHYL SULFATE UR QL SCN: NEGATIVE NG/ML
EUTYLONE UR QL: NEGATIVE NG/ML
FENTANYL UR QL CFM: NEGATIVE NG/ML
GLIADIN IGG SER IA-ACNC: NEGATIVE NG/ML
HYDROCODONE UR QL CFM: NEGATIVE NG/ML
HYDROMORPHONE UR QL CFM: NEGATIVE NG/ML
LORAZEPAM UR QL CFM: NEGATIVE NG/ML
ME-PHENIDATE UR QL CFM: NEGATIVE NG/ML
MEPERIDINE UR QL CFM: NEGATIVE NG/ML
METHADONE UR QL CFM: NEGATIVE NG/ML
METHAMPHET UR QL CFM: NEGATIVE NG/ML
MORPHINE UR QL CFM: NEGATIVE NG/ML
NALTREXONE UR QL CFM: NEGATIVE NG/ML
NITRITE UR QL: NORMAL UG/ML
NORBUPRENORPHINE UR QL CFM: NEGATIVE NG/ML
NORDIAZEPAM UR QL CFM: NEGATIVE NG/ML
NORFENTANYL UR QL CFM: NEGATIVE NG/ML
NORHYDROCODONE UR QL CFM: NEGATIVE NG/ML
NORMEPERIDINE UR QL CFM: NEGATIVE NG/ML
NOROXYCODONE UR QL CFM: NEGATIVE NG/ML
OXAZEPAM UR QL CFM: NEGATIVE NG/ML
OXYCODONE UR QL CFM: NEGATIVE NG/ML
OXYMORPHONE UR QL CFM: NEGATIVE NG/ML
OXYMORPHONE UR QL CFM: NEGATIVE NG/ML
PARA-FLUOROFENTANYL QUANTIFICATION: NORMAL NG/ML
PHENOBARB UR QL CFM: NEGATIVE NG/ML
RESULT ALL_PRESCRIBED MEDS AND SPECIAL INSTRUCTIONS: NORMAL
SECOBARBITAL UR QL CFM: NEGATIVE NG/ML
SL AMB 4-ANPP QUANTIFICATION: NORMAL NG/ML
SL AMB 5F-ADB-M7 METABOLITE QUANTIFICATION: NEGATIVE NG/ML
SL AMB 7-OH-MITRAGYNINE (KRATOM ALKALOID) QUANTIFICATION: NEGATIVE NG/ML
SL AMB AB-FUBINACA-M3 METABOLITE QUANTIFICATION: NEGATIVE NG/ML
SL AMB ACETYL FENTANYL QUANTIFICATION: NORMAL NG/ML
SL AMB ACETYL NORFENTANYL QUANTIFICATION: NORMAL NG/ML
SL AMB ACRYL FENTANYL QUANTIFICATION: NORMAL NG/ML
SL AMB CARFENTANIL QUANTIFICATION: NORMAL NG/ML
SL AMB CTHC (MARIJUANA METABOLITE) QUANTIFICATION: ABNORMAL NG/ML
SL AMB DEXTRORPHAN (DEXTROMETHORPHAN METABOLITE) QUANT: NEGATIVE NG/ML
SL AMB GABAPENTIN QUANTIFICATION: NEGATIVE
SL AMB JWH018 METABOLITE QUANTIFICATION: NEGATIVE NG/ML
SL AMB JWH073 METABOLITE QUANTIFICATION: NEGATIVE NG/ML
SL AMB MDMB-FUBINACA-M1 METABOLITE QUANTIFICATION: NEGATIVE NG/ML
SL AMB METHYLONE QUANTIFICATION: NEGATIVE NG/ML
SL AMB N-DESMETHYL-TRAMADOL QUANTIFICATION: NEGATIVE NG/ML
SL AMB PHENTERMINE QUANTIFICATION: NEGATIVE NG/ML
SL AMB PREGABALIN QUANTIFICATION: NEGATIVE
SL AMB RCS4 METABOLITE QUANTIFICATION: NEGATIVE NG/ML
SL AMB RITALINIC ACID QUANTIFICATION: NEGATIVE NG/ML
SMOOTH MUSCLE AB TITR SER IF: NEGATIVE NG/ML
SPECIMEN DRAWN SERPL: NEGATIVE NG/ML
SPECIMEN PH ACCEPTABLE UR: NORMAL
TAPENTADOL UR QL CFM: NEGATIVE NG/ML
TEMAZEPAM UR QL CFM: NEGATIVE NG/ML
TEMAZEPAM UR QL CFM: NEGATIVE NG/ML
TRAMADOL UR QL CFM: NEGATIVE NG/ML
URATE/CREAT 24H UR: NEGATIVE NG/ML

## 2023-02-16 ENCOUNTER — TELEPHONE (OUTPATIENT)
Dept: ADMINISTRATIVE | Facility: OTHER | Age: 48
End: 2023-02-16

## 2023-02-16 NOTE — TELEPHONE ENCOUNTER
----- Message from Jorge Clark sent at 2/16/2023  7:27 AM EST -----  Regarding: Care gap request (Mammo)  02/16/23 7:27 AM    Hello, our patient Davonte Gonzalez has had Mammogram completed/performed  Please assist in updating the patient chart by pulling the Care Everywhere (CE) document  The date of service is 02/15/2023       Thank you,  Jorge Clark  [facility] [address] [place] [place]

## 2023-02-24 NOTE — TELEPHONE ENCOUNTER
Upon review of the In Basket request we Not a bilateral mammogram     Any additional questions or concerns should be emailed to the Practice Liaisons via the appropriate education email address, please do not reply via In Basket      Thank you  Silvana Bartlett MA

## 2024-07-22 ENCOUNTER — APPOINTMENT (EMERGENCY)
Dept: CT IMAGING | Facility: HOSPITAL | Age: 49
End: 2024-07-22
Payer: COMMERCIAL

## 2024-07-22 ENCOUNTER — HOSPITAL ENCOUNTER (EMERGENCY)
Facility: HOSPITAL | Age: 49
Discharge: HOME | End: 2024-07-23
Attending: EMERGENCY MEDICINE
Payer: COMMERCIAL

## 2024-07-22 DIAGNOSIS — M54.10 RADICULOPATHY: Primary | ICD-10-CM

## 2024-07-22 PROCEDURE — 93005 ELECTROCARDIOGRAM TRACING: CPT

## 2024-07-22 PROCEDURE — 99284 EMERGENCY DEPT VISIT MOD MDM: CPT

## 2024-07-22 PROCEDURE — 72131 CT LUMBAR SPINE W/O DYE: CPT

## 2024-07-23 VITALS
WEIGHT: 211.42 LBS | BODY MASS INDEX: 36.09 KG/M2 | TEMPERATURE: 98 F | OXYGEN SATURATION: 97 % | HEIGHT: 64 IN | RESPIRATION RATE: 18 BRPM | SYSTOLIC BLOOD PRESSURE: 107 MMHG | DIASTOLIC BLOOD PRESSURE: 63 MMHG | HEART RATE: 60 BPM

## 2024-07-23 LAB
ATRIAL RATE: 60 BPM
P AXIS: 34 DEGREES
PR INTERVAL: 160 MS
QRS AXIS: 72 DEGREES
QRSD INTERVAL: 94 MS
QT INTERVAL: 424 MS
QTC INTERVAL: 424 MS
T WAVE AXIS: 54 DEGREES
VENTRICULAR RATE: 60 BPM

## 2024-07-23 PROCEDURE — 96372 THER/PROPH/DIAG INJ SC/IM: CPT

## 2024-07-23 PROCEDURE — 99284 EMERGENCY DEPT VISIT MOD MDM: CPT | Performed by: EMERGENCY MEDICINE

## 2024-07-23 PROCEDURE — 93010 ELECTROCARDIOGRAM REPORT: CPT | Performed by: INTERNAL MEDICINE

## 2024-07-23 RX ORDER — IBUPROFEN 800 MG/1
800 TABLET ORAL 3 TIMES DAILY
Qty: 21 TABLET | Refills: 0 | Status: SHIPPED | OUTPATIENT
Start: 2024-07-23

## 2024-07-23 RX ORDER — KETOROLAC TROMETHAMINE 30 MG/ML
15 INJECTION, SOLUTION INTRAMUSCULAR; INTRAVENOUS ONCE
Status: COMPLETED | OUTPATIENT
Start: 2024-07-23 | End: 2024-07-23

## 2024-07-23 RX ADMIN — KETOROLAC TROMETHAMINE 15 MG: 30 INJECTION, SOLUTION INTRAMUSCULAR; INTRAVENOUS at 00:57

## 2024-07-23 NOTE — ED PROVIDER NOTES
History  Chief Complaint   Patient presents with    Extremity Weakness     Pt c/o R leg numbness in front and lower back pain on going for 5 months worsening.      49-year-old female presents emergency department for evaluation back pain.  Patient's had 5 months of low back pain which wraps around to her anterior thigh and her medial lower leg.  Sometimes associated with paresthesias numbness.  She has had no weakness.  She has no bowel or bladder incontinence no saddle anesthesia no fevers or chills.  She has been going to PT as directed by her PCP but is concerned because she states that no one has been able to give her a clear answer as to the source of her symptoms.        Prior to Admission Medications   Prescriptions Last Dose Informant Patient Reported? Taking?   Fluticasone-Salmeterol (Advair) 250-50 mcg/dose inhaler   Yes No   Sig: inhale 1 puff by mouth and INTO THE LUNGS twice a day   albuterol (PROVENTIL HFA,VENTOLIN HFA) 90 mcg/act inhaler  Self Yes No   Sig: Inhale 2 puffs every 6 (six) hours as needed   amphetamine-dextroamphetamine (ADDERALL, 5MG,) 5 MG tablet   No No   Sig: Take 1 tablet (5 mg total) by mouth 2 (two) times a day Max Daily Amount: 10 mg   cetirizine (ZyrTEC) 10 mg tablet  Self Yes No   Sig: Take 10 mg by mouth daily   lidocaine (LIDODERM) 5 %  Self Yes No   Sig: PLACE 1 PATCH ON THE SKIN ONCE DAILY FOR 12 HOURS FOR 7 DAYS SAM...  (REFER TO PRESCRIPTION NOTES).   Patient not taking: Reported on 11/9/2022   montelukast (SINGULAIR) 10 mg tablet  Self Yes No   Sig: Take 10 mg by mouth daily   ondansetron (ZOFRAN-ODT) 4 mg disintegrating tablet  Self No No   Sig: Take 1 tablet (4 mg total) by mouth every 8 (eight) hours as needed for nausea   Patient not taking: Reported on 11/9/2022   pantoprazole (PROTONIX) 40 mg tablet  Self No No   Sig: take 1 tablet by mouth once daily   pramipexole (MIRAPEX) 0.25 mg tablet  Self Yes No   Sig: Take 0.25 tablets by mouth daily at bedtime       Facility-Administered Medications: None       Past Medical History:   Diagnosis Date    Asthma     Biliary colic 2021    COPD (chronic obstructive pulmonary disease) (HCC)     Obesity     Sleep apnea     Sleep apnea 2021       Past Surgical History:   Procedure Laterality Date     SECTION  2013    CHOLECYSTECTOMY LAPAROSCOPIC N/A 2021    Procedure: CHOLECYSTECTOMY LAPAROSCOPIC;  Surgeon: Aurelio Blackwell DO;  Location: [facility] OR;  Service: General    GASTRIC RESTRICTION SURGERY  2018       Family History   Problem Relation Age of Onset    Coronary artery disease Mother     Autoimmune disease Mother         liver    Liver disease Mother     Heart disease Mother     Hypertension Father     Heart disease Father     Stroke Father     Diabetes Father     Autoimmune disease Sister         lupus and liver     I have reviewed and agree with the history as documented.    E-Cigarette/Vaping    E-Cigarette Use Never User      E-Cigarette/Vaping Substances    Nicotine No     THC No     CBD No     Flavoring No     Other No     Unknown No      Social History     Tobacco Use    Smoking status: Former    Smokeless tobacco: Never    Tobacco comments:        Vaping Use    Vaping status: Never Used   Substance Use Topics    Alcohol use: No    Drug use: Not Currently       Review of Systems   Constitutional:  Negative for appetite change, chills, fatigue and fever.   HENT:  Negative for sneezing and sore throat.    Eyes:  Negative for visual disturbance.   Respiratory:  Negative for cough, choking, chest tightness, shortness of breath and wheezing.    Cardiovascular:  Negative for chest pain and palpitations.   Gastrointestinal:  Negative for abdominal pain, constipation, diarrhea, nausea and vomiting.   Genitourinary:  Negative for difficulty urinating and dysuria.   Musculoskeletal:  Positive for back pain.   Neurological:  Positive for numbness. Negative for dizziness, weakness, light-headedness  and headaches.   All other systems reviewed and are negative.      Physical Exam  Physical Exam  Vitals and nursing note reviewed.   Constitutional:       General: She is not in acute distress.     Appearance: She is well-developed. She is not diaphoretic.   HENT:      Head: Normocephalic and atraumatic.   Eyes:      Pupils: Pupils are equal, round, and reactive to light.   Neck:      Vascular: No JVD.      Trachea: No tracheal deviation.   Cardiovascular:      Rate and Rhythm: Normal rate and regular rhythm.      Heart sounds: Normal heart sounds. No murmur heard.     No friction rub. No gallop.   Pulmonary:      Effort: Pulmonary effort is normal. No respiratory distress.      Breath sounds: Normal breath sounds. No wheezing or rales.   Abdominal:      General: Bowel sounds are normal. There is no distension.      Palpations: Abdomen is soft.      Tenderness: There is no abdominal tenderness. There is no guarding or rebound.   Musculoskeletal:      Comments: There is some paralumbar low back pain.   Skin:     General: Skin is warm and dry.      Coloration: Skin is not pale.   Neurological:      Mental Status: She is alert and oriented to person, place, and time.      Cranial Nerves: No cranial nerve deficit.      Motor: No abnormal muscle tone.   Psychiatric:         Behavior: Behavior normal.         Vital Signs  ED Triage Vitals   Temperature Pulse Respirations Blood Pressure SpO2   07/22/24 2148 07/22/24 2148 07/22/24 2148 07/22/24 2148 07/22/24 2148   98 °F (36.7 °C) 67 18 129/69 99 %      Temp Source Heart Rate Source Patient Position - Orthostatic VS BP Location FiO2 (%)   07/22/24 2148 07/22/24 2148 07/22/24 2148 07/22/24 2148 --   Temporal Monitor Sitting Left arm       Pain Score       07/22/24 2230       1           Vitals:    07/22/24 2230 07/22/24 2300 07/23/24 0000 07/23/24 0030   BP: 138/70 113/58 96/55 107/63   Pulse: 65 60 63 60   Patient Position - Orthostatic VS: Lying Sitting Sitting Sitting          Visual Acuity  Visual Acuity      Flowsheet Row Most Recent Value   L Pupil Size (mm) 3   R Pupil Size (mm) 3            ED Medications  Medications   ketorolac (TORADOL) injection 15 mg (15 mg Intramuscular Given 7/23/24 0057)       Diagnostic Studies  Results Reviewed       None                   CT spine lumbar without contrast   Final Result by Ash Monet DO (07/22 2346)      No acute osseous abnormality.      Degenerative changes, as described.      Workstation performed: [id number]                    Procedures  Procedures         ED Course                                 SBIRT 22yo+      Flowsheet Row Most Recent Value   Initial Alcohol Screen: US AUDIT-C     1. How often do you have a drink containing alcohol? 0 Filed at: 07/22/2024 2259   2. How many drinks containing alcohol do you have on a typical day you are drinking?  0 Filed at: 07/22/2024 2259   3b. FEMALE Any Age, or MALE 65+: How often do you have 4 or more drinks on one occassion? 0 Filed at: 07/22/2024 2259   Audit-C Score 0 Filed at: 07/22/2024 2259   LANA: How many times in the past year have you...    Used an illegal drug or used a prescription medication for non-medical reasons? Never Filed at: 07/22/2024 2259                      Medical Decision Making  49-year-old female with low back.  The symptoms that she describes are consistent with radicular pain and approximately L3 or L4 dermatome, will check L-spine CT and refer to comp spine    Amount and/or Complexity of Data Reviewed  Radiology: ordered.    Risk  Prescription drug management.                 Disposition  Final diagnoses:   Radiculopathy     Time reflects when diagnosis was documented in both MDM as applicable and the Disposition within this note       Time User Action Codes Description Comment    7/23/2024 12:10 AM Krunal Matos [R53.1] Weakness     7/23/2024 12:10 AM Krunal Matos Remove [R53.1] Weakness     7/23/2024 12:10 AM Krunal Matos [M54.10]  Radiculopathy           ED Disposition       ED Disposition   Discharge    Condition   Stable    Date/Time   Tue Jul 23, 2024 0010    Comment   Junie Manley discharge to home/self care.                   Follow-up Information    None         Discharge Medication List as of 7/23/2024 12:16 AM        START taking these medications    Details   ibuprofen (MOTRIN) 800 mg tablet Take 1 tablet (800 mg total) by mouth 3 (three) times a day, Starting Tue 7/23/2024, Normal           CONTINUE these medications which have NOT CHANGED    Details   albuterol (PROVENTIL HFA,VENTOLIN HFA) 90 mcg/act inhaler Inhale 2 puffs every 6 (six) hours as needed, Starting Sun 12/27/2015, Historical Med      amphetamine-dextroamphetamine (ADDERALL, 5MG,) 5 MG tablet Take 1 tablet (5 mg total) by mouth 2 (two) times a day Max Daily Amount: 10 mg, Starting Wed 11/9/2022, Normal      cetirizine (ZyrTEC) 10 mg tablet Take 10 mg by mouth daily, Historical Med      Fluticasone-Salmeterol (Advair) 250-50 mcg/dose inhaler inhale 1 puff by mouth and INTO THE LUNGS twice a day, Historical Med      lidocaine (LIDODERM) 5 % PLACE 1 PATCH ON THE SKIN ONCE DAILY FOR 12 HOURS FOR 7 DAYS SAM...  (REFER TO PRESCRIPTION NOTES)., Historical Med      montelukast (SINGULAIR) 10 mg tablet Take 10 mg by mouth daily, Starting Wed 1/31/2018, Until Tue 9/7/2021, Historical Med      ondansetron (ZOFRAN-ODT) 4 mg disintegrating tablet Take 1 tablet (4 mg total) by mouth every 8 (eight) hours as needed for nausea, Starting Tue 8/3/2021, Normal      pantoprazole (PROTONIX) 40 mg tablet take 1 tablet by mouth once daily, Normal      pramipexole (MIRAPEX) 0.25 mg tablet Take 0.25 tablets by mouth daily at bedtime, Starting Mon 4/16/2018, Historical Med                 PDMP Review         Value Time User    PDMP Reviewed  Yes 11/9/2022 11:32 AM Juvencio Trejo MD            ED Provider  Electronically Signed by             Krunal Matos MD  07/23/24 0140

## 2024-07-24 ENCOUNTER — TELEPHONE (OUTPATIENT)
Dept: PHYSICAL THERAPY | Facility: OTHER | Age: 49
End: 2024-07-24

## 2024-08-07 ENCOUNTER — OFFICE VISIT (OUTPATIENT)
Dept: NEUROSURGERY | Facility: CLINIC | Age: 49
End: 2024-08-07
Payer: COMMERCIAL

## 2024-08-07 VITALS
HEIGHT: 64 IN | BODY MASS INDEX: 36.02 KG/M2 | OXYGEN SATURATION: 96 % | DIASTOLIC BLOOD PRESSURE: 84 MMHG | TEMPERATURE: 97.5 F | WEIGHT: 211 LBS | RESPIRATION RATE: 16 BRPM | HEART RATE: 55 BPM | SYSTOLIC BLOOD PRESSURE: 120 MMHG

## 2024-08-07 DIAGNOSIS — M54.50 LOWER BACK PAIN: Primary | ICD-10-CM

## 2024-08-07 PROCEDURE — 99203 OFFICE O/P NEW LOW 30 MIN: CPT | Performed by: PHYSICIAN ASSISTANT

## 2024-08-07 NOTE — PROGRESS NOTES
Ambulatory Visit  Name: Junie Manley      : 1975      MRN: [id number]  Encounter Provider: Bobbi Foster PA-C  Encounter Date: 2024   Encounter department: [facility] [place]    Assessment & Plan   1. Lower back pain  Assessment & Plan:  Patient presents as a self referral for the evaluation of back pain  Reports back pain without radicular pain but does endorse +numbness, +weakness RLE, +gait instability  Has trialed PT which aggravated her back and leg numbness  No pain management / injection    Imaging:  CT lumbar spine 2024: No acute osseous abnormality. Degenerative changes, as described.  MRI lumbar spine wo contrst 2024:  completed at outside facility, mild degenerative disease noted throughout without overly compressive etiology.    Plan:  Imaging reviewed with patient, demonstrates mild degenerative disease.  I do not feel surgical intervention would be warranted at this time, nothing to explain her RLE numbness or back pain  Ambulatory referral provided for pain management for consideration of ARABELLA versus RFA  She is okay to continue over-the-counter medication for pain control  Continue activity as tolerated and use assistive devices if necessary for ambulation  She has an EMG set up sometime in September, I would like her to complete this and return to the office for review with an AP.    If this does not explain patient's current complaints, I feel a vascular surgery referral may be warranted to assess if this may be a circulatory issue.  Patient is to return to the office sooner should patient develop worsening symptoms or red flag signs    Orders:  -     Ambulatory referral to Spine & Pain Management; Future      History of Present Illness     Junie Manley is a 49 y.o. female with past medical history significant for obesity status post bariatric surgery several years ago, COPD, sleep apnea who presents as a self-referral for the evaluation of  back pain and right lower extremity numbness.    Patient states she has had longstanding back pain however about 5 months ago she started noticing numbness in her right leg without any inciting injury.  She states initially it started as anterior thigh numbness, stating it felt somewhat like a sunburn on her thigh.  She started going to physical therapy which aggravated her back and then precipitated worsening symptoms in her right lower extremity.   Numbness started wrapping around her entire knee, extending down the anterior shin and into her whole foot.  Her foot is the most numb out of her whole leg.  She states it feels like something is cutting off her circulation (like she has something tight around her big toe or she has tight clothes on her leg) and at times her foot gets very cold but she states her pulses have been checked with Dopplers and they are present.  She states she can feel when people touch her leg but she cannot always appreciate pressure differences.  She has not trialed injections in the past.  She feels this is affecting her day-to-day activities as she cannot put weight on her leg secondary to the numbness, she cannot always feel the paddle under her foot, etc.  She cannot be as active as she would like to be.  No BBI or saddle anesthesia.  No prior surgery on her back.    Review of Systems   Constitutional: Negative.    HENT: Negative.     Eyes: Negative.    Respiratory: Negative.     Cardiovascular: Negative.    Gastrointestinal: Negative.    Endocrine: Negative.    Genitourinary: Negative.    Musculoskeletal:  Positive for back pain (center LBP), gait problem (balance os off) and myalgias (evette horse in right foot).        PT did not help relief pain    No injections/ no PM   Skin: Negative.    Neurological:  Positive for weakness (right leg/foot) and numbness (right leg numbness/burning from groin to toes).   Psychiatric/Behavioral: Negative.       Past Medical History   Past Medical  History:   Diagnosis Date    Asthma     Biliary colic 2021    COPD (chronic obstructive pulmonary disease) (HCC)     Obesity     Sleep apnea     Sleep apnea 2021     Past Surgical History:   Procedure Laterality Date     SECTION      CHOLECYSTECTOMY LAPAROSCOPIC N/A 2021    Procedure: CHOLECYSTECTOMY LAPAROSCOPIC;  Surgeon: Aurelio Blackwell DO;  Location: [facility];  Service: General    GASTRIC RESTRICTION SURGERY  2018     Family History   Problem Relation Age of Onset    Coronary artery disease Mother     Autoimmune disease Mother         liver    Liver disease Mother     Heart disease Mother     Hypertension Father     Heart disease Father     Stroke Father     Diabetes Father     Autoimmune disease Sister         lupus and liver     Current Outpatient Medications on File Prior to Visit   Medication Sig Dispense Refill    albuterol (PROVENTIL HFA,VENTOLIN HFA) 90 mcg/act inhaler Inhale 2 puffs every 6 (six) hours as needed      cetirizine (ZyrTEC) 10 mg tablet Take 10 mg by mouth daily      pantoprazole (PROTONIX) 40 mg tablet take 1 tablet by mouth once daily 90 tablet 3    pramipexole (MIRAPEX) 0.25 mg tablet Take 0.25 tablets by mouth daily at bedtime      amphetamine-dextroamphetamine (ADDERALL, 5MG,) 5 MG tablet Take 1 tablet (5 mg total) by mouth 2 (two) times a day Max Daily Amount: 10 mg (Patient not taking: Reported on 2024) 60 tablet 0    Fluticasone-Salmeterol (Advair) 250-50 mcg/dose inhaler inhale 1 puff by mouth and INTO THE LUNGS twice a day (Patient not taking: Reported on 2024)      ibuprofen (MOTRIN) 800 mg tablet Take 1 tablet (800 mg total) by mouth 3 (three) times a day (Patient not taking: Reported on 2024) 21 tablet 0    lidocaine (LIDODERM) 5 % PLACE 1 PATCH ON THE SKIN ONCE DAILY FOR 12 HOURS FOR 7 DAYS SAM...  (REFER TO PRESCRIPTION NOTES). (Patient not taking: Reported on 2022)      montelukast (SINGULAIR) 10 mg tablet Take 10 mg by  "mouth daily (Patient not taking: Reported on 8/7/2024)      ondansetron (ZOFRAN-ODT) 4 mg disintegrating tablet Take 1 tablet (4 mg total) by mouth every 8 (eight) hours as needed for nausea (Patient not taking: Reported on 11/9/2022) 20 tablet 0     No current facility-administered medications on file prior to visit.     Allergies   Allergen Reactions    Sulfa Antibiotics GI Intolerance      Social History     Tobacco Use    Smoking status: Former    Smokeless tobacco: Never    Tobacco comments:     2015   Vaping Use    Vaping status: Never Used   Substance and Sexual Activity    Alcohol use: No    Drug use: Not Currently    Sexual activity: Yes     Partners: Male     Objective     /84   Pulse 55   Temp 97.5 °F (36.4 °C)   Resp 16   Ht 5' 4\" (1.626 m)   Wt 95.7 kg (211 lb)   SpO2 96%   BMI 36.22 kg/m²     Physical Exam  Constitutional:       General: She is awake.      Appearance: She is well-developed.   HENT:      Head: Normocephalic and atraumatic.   Eyes:      Conjunctiva/sclera: Conjunctivae normal.   Cardiovascular:      Rate and Rhythm: Normal rate.   Pulmonary:      Effort: Pulmonary effort is normal. No respiratory distress.   Musculoskeletal:         General: Normal range of motion.   Skin:     General: Skin is warm.   Neurological:      Mental Status: She is alert and oriented to person, place, and time.      Motor: Motor strength is normal.     Coordination: Finger-Nose-Finger Test normal.      Deep Tendon Reflexes:      Reflex Scores:       Patellar reflexes are 2+ on the right side and 2+ on the left side.  Psychiatric:         Attention and Perception: Attention and perception normal.         Mood and Affect: Mood and affect normal.         Speech: Speech normal.         Behavior: Behavior normal. Behavior is cooperative.         Thought Content: Thought content normal.         Cognition and Memory: Cognition and memory normal.         Judgment: Judgment normal.       Neurologic Exam "     Mental Status   Oriented to person, place, and time.   Follows 1 step commands.   Attention: normal. Concentration: normal.   Speech: speech is normal   Level of consciousness: alert  Knowledge: good.   Normal comprehension.     Cranial Nerves     CN III, IV, VI   Conjugate gaze: present    CN VIII   Hearing: intact    Motor Exam   Muscle bulk: normal  Overall muscle tone: normal  Right arm pronator drift: absent  Left arm pronator drift: absent    Strength   Strength 5/5 throughout.     Sensory Exam   Altered sensation to LT in the whole RLE  Able to note sharp sensation bilaterally but cannot appreciate pressure in RLE  DST intact     Gait, Coordination, and Reflexes     Gait  Gait: (Antalgic gait, slightly off balance secondary to numbness in right lower extremity)    Coordination   Finger to nose coordination: normal    Tremor   Resting tremor: absent  Intention tremor: absent  Action tremor: absent    Reflexes   Right patellar: 2+  Left patellar: 2+  Right : 2+  Left : 2+  Right ankle clonus: absent  Left ankle clonus: absent      Administrative Statements   I have spent a total time of 40 minutes in caring for this patient on the day of the visit/encounter including Diagnostic results, Instructions for management, Patient and family education, Impressions, Counseling / Coordination of care, Documenting in the medical record, Reviewing / ordering tests, medicine, procedures  , and Obtaining or reviewing history  .

## 2024-08-07 NOTE — PATIENT INSTRUCTIONS
Ambulatory referral provided for pain management for consideration of epidural steroid injection versus radiofrequency ablation.  Continue over-the-counter medications for pain control.  Continue activity as tolerated and use assistive devices if necessary for ambulation.  Complete EMG as ordered in September and return to the office afterwards for review.  Pending those results you may need referral to vascular surgery.

## 2024-08-07 NOTE — ASSESSMENT & PLAN NOTE
Patient presents as a self referral for the evaluation of back pain  Reports back pain without radicular pain but does endorse +numbness, +weakness RLE, +gait instability  Has trialed PT which aggravated her back and leg numbness  No pain management / injection    Imaging:  CT lumbar spine 7/22/2024: No acute osseous abnormality. Degenerative changes, as described.  MRI lumbar spine wo contrst 7/24/2024:  completed at outside facility, mild degenerative disease noted throughout without overly compressive etiology.    Plan:  Imaging reviewed with patient, demonstrates mild degenerative disease.  I do not feel surgical intervention would be warranted at this time, nothing to explain her RLE numbness or back pain  Ambulatory referral provided for pain management for consideration of ARABELLA versus RFA  She is okay to continue over-the-counter medication for pain control  Continue activity as tolerated and use assistive devices if necessary for ambulation  She has an EMG set up sometime in September, I would like her to complete this and return to the office for review with an AP.    If this does not explain patient's current complaints, I feel a vascular surgery referral may be warranted to assess if this may be a circulatory issue.  Patient is to return to the office sooner should patient develop worsening symptoms or red flag signs

## 2024-09-09 ENCOUNTER — TELEPHONE (OUTPATIENT)
Age: 49
End: 2024-09-09

## 2024-09-09 NOTE — TELEPHONE ENCOUNTER
9/9/24 CALLED PT AND LMOM FOR HER TO CB TO OFFER TO RESCHEDULE CANCELLED F/U APPT WITH AP;  Appointment canceled for Junie Manley ([id number])   Visit type: OFFICE VISIT LONG [facility]   9/10/2024 11:15 AM (45 minutes) with Treva Martinez PA-C in [facility] [place]      Reason for cancellation: Canceled via MyChart

## (undated) DEVICE — PAD GROUNDING ADULT

## (undated) DEVICE — ENDOPOUCH RETRIEVER SPECIMEN RETRIEVAL BAGS: Brand: ENDOPOUCH RETRIEVER

## (undated) DEVICE — SUT VICRYL 0 UR-6 27 IN J603H

## (undated) DEVICE — DRAPE EQUIPMENT RF WAND

## (undated) DEVICE — SUT MONOCRYL 4-0 PS-2 18 IN Y496G

## (undated) DEVICE — ENDOPATH 5MM CURVED SCISSORS WITH MONOPOLAR CAUTERY: Brand: ENDOPATH

## (undated) DEVICE — [HIGH FLOW INSUFFLATOR,  DO NOT USE IF PACKAGE IS DAMAGED,  KEEP DRY,  KEEP AWAY FROM SUNLIGHT,  PROTECT FROM HEAT AND RADIOACTIVE SOURCES.]: Brand: PNEUMOSURE

## (undated) DEVICE — GLOVE SRG BIOGEL 7

## (undated) DEVICE — ELECTRODE LAP L WIRE E-Z CLEAN 33CM -0100

## (undated) DEVICE — TROCAR: Brand: KII® SLEEVE

## (undated) DEVICE — SYRINGE 10ML LL

## (undated) DEVICE — INTENDED FOR TISSUE SEPARATION, AND OTHER PROCEDURES THAT REQUIRE A SHARP SURGICAL BLADE TO PUNCTURE OR CUT.: Brand: BARD-PARKER SAFETY BLADES SIZE 15, STERILE

## (undated) DEVICE — CHLORAPREP HI-LITE 26ML ORANGE

## (undated) DEVICE — ADHESIVE SKIN HIGH VISCOSITY EXOFIN 1ML

## (undated) DEVICE — TROCAR: Brand: KII FIOS FIRST ENTRY

## (undated) DEVICE — SCD SEQUENTIAL COMPRESSION COMFORT SLEEVE MEDIUM KNEE LENGTH: Brand: KENDALL SCD

## (undated) DEVICE — LIGAMAX 5 MM ENDOSCOPIC MULTIPLE CLIP APPLIER: Brand: LIGAMAX

## (undated) DEVICE — TUBING SMOKE EVAC W/FILTRATION DEVICE PLUMEPORT ACTIV

## (undated) DEVICE — GLOVE INDICATOR PI UNDERGLOVE SZ 7 BLUE

## (undated) DEVICE — IRRIG ENDO FLO TUBING

## (undated) DEVICE — LIGHT HANDLE COVER SLEEVE DISP BLUE STELLAR

## (undated) DEVICE — ALLENTOWN LAP CHOLE APP PACK: Brand: CARDINAL HEALTH

## (undated) DEVICE — PENCIL ELECTROSURG E-Z CLEAN -0035H